# Patient Record
Sex: FEMALE | Race: WHITE | NOT HISPANIC OR LATINO
[De-identification: names, ages, dates, MRNs, and addresses within clinical notes are randomized per-mention and may not be internally consistent; named-entity substitution may affect disease eponyms.]

---

## 2017-03-20 ENCOUNTER — APPOINTMENT (OUTPATIENT)
Dept: INTERNAL MEDICINE | Facility: CLINIC | Age: 42
End: 2017-03-20

## 2017-03-20 VITALS
BODY MASS INDEX: 33.27 KG/M2 | SYSTOLIC BLOOD PRESSURE: 134 MMHG | TEMPERATURE: 98.8 F | WEIGHT: 207 LBS | HEART RATE: 76 BPM | RESPIRATION RATE: 12 BRPM | HEIGHT: 66 IN | OXYGEN SATURATION: 99 % | DIASTOLIC BLOOD PRESSURE: 81 MMHG

## 2017-03-20 DIAGNOSIS — N60.01 SOLITARY CYST OF RIGHT BREAST: ICD-10-CM

## 2017-03-20 DIAGNOSIS — G47.9 SLEEP DISORDER, UNSPECIFIED: ICD-10-CM

## 2017-03-20 DIAGNOSIS — R01.1 CARDIAC MURMUR, UNSPECIFIED: ICD-10-CM

## 2017-03-20 DIAGNOSIS — R92.2 INCONCLUSIVE MAMMOGRAM: ICD-10-CM

## 2017-03-21 LAB
25(OH)D3 SERPL-MCNC: 29.1 NG/ML
ALBUMIN SERPL ELPH-MCNC: 4.4 G/DL
ALP BLD-CCNC: 57 U/L
ALT SERPL-CCNC: 16 U/L
ANION GAP SERPL CALC-SCNC: 16 MMOL/L
APPEARANCE: ABNORMAL
AST SERPL-CCNC: 20 U/L
BACTERIA: ABNORMAL
BILIRUB SERPL-MCNC: 0.5 MG/DL
BILIRUBIN URINE: NEGATIVE
BLOOD URINE: NEGATIVE
BUN SERPL-MCNC: 11 MG/DL
CALCIUM SERPL-MCNC: 9.4 MG/DL
CHLORIDE SERPL-SCNC: 101 MMOL/L
CHOLEST SERPL-MCNC: 215 MG/DL
CHOLEST/HDLC SERPL: 2.9 RATIO
CO2 SERPL-SCNC: 23 MMOL/L
COLOR: YELLOW
CREAT SERPL-MCNC: 0.76 MG/DL
FERRITIN SERPL-MCNC: 39.9 NG/ML
FOLATE SERPL-MCNC: 16.9 NG/ML
GLUCOSE QUALITATIVE U: NORMAL MG/DL
GLUCOSE SERPL-MCNC: 95 MG/DL
HBA1C MFR BLD HPLC: 5.4 %
HDLC SERPL-MCNC: 74 MG/DL
HYALINE CASTS: 4 /LPF
IRON SERPL-MCNC: 100 UG/DL
KETONES URINE: NEGATIVE
LDLC SERPL CALC-MCNC: 121 MG/DL
LEUKOCYTE ESTERASE URINE: ABNORMAL
MICROSCOPIC-UA: NORMAL
NITRITE URINE: NEGATIVE
PH URINE: 5
POTASSIUM SERPL-SCNC: 4.7 MMOL/L
PROT SERPL-MCNC: 6.7 G/DL
PROTEIN URINE: NEGATIVE MG/DL
RED BLOOD CELLS URINE: 1 /HPF
SODIUM SERPL-SCNC: 140 MMOL/L
SPECIFIC GRAVITY URINE: 1.02
SQUAMOUS EPITHELIAL CELLS: 16 /HPF
TRIGL SERPL-MCNC: 98 MG/DL
TSH SERPL-ACNC: 1.4 UIU/ML
URATE SERPL-MCNC: 2.9 MG/DL
UROBILINOGEN URINE: NORMAL MG/DL
VIT B12 SERPL-MCNC: 783 PG/ML
WHITE BLOOD CELLS URINE: 5 /HPF

## 2017-03-24 LAB
BASOPHILS # BLD AUTO: 0.05 K/UL
BASOPHILS NFR BLD AUTO: 0.5 %
EOSINOPHIL # BLD AUTO: 0.11 K/UL
EOSINOPHIL NFR BLD AUTO: 1.1 %
HCT VFR BLD CALC: 44.2 %
HGB BLD-MCNC: 14.2 G/DL
IMM GRANULOCYTES NFR BLD AUTO: 0.2 %
LYMPHOCYTES # BLD AUTO: 2.54 K/UL
LYMPHOCYTES NFR BLD AUTO: 26.3 %
MAN DIFF?: NORMAL
MCHC RBC-ENTMCNC: 30 PG
MCHC RBC-ENTMCNC: 32.1 GM/DL
MCV RBC AUTO: 93.2 FL
MONOCYTES # BLD AUTO: 0.49 K/UL
MONOCYTES NFR BLD AUTO: 5.1 %
NEUTROPHILS # BLD AUTO: 6.45 K/UL
NEUTROPHILS NFR BLD AUTO: 66.8 %
PLATELET # BLD AUTO: 284 K/UL
RBC # BLD: 4.74 M/UL
RBC # FLD: 13 %
WBC # FLD AUTO: 9.66 K/UL

## 2017-04-17 LAB — BACTERIA UR CULT: ABNORMAL

## 2017-04-19 LAB
APPEARANCE: ABNORMAL
BACTERIA: ABNORMAL
BILIRUBIN URINE: NEGATIVE
BLOOD URINE: NEGATIVE
COLOR: YELLOW
GLUCOSE QUALITATIVE U: 500 MG/DL
HYALINE CASTS: 0 /LPF
KETONES URINE: NEGATIVE
LEUKOCYTE ESTERASE URINE: NEGATIVE
MICROSCOPIC-UA: NORMAL
NITRITE URINE: NEGATIVE
PH URINE: 7
PROTEIN URINE: NEGATIVE MG/DL
RED BLOOD CELLS URINE: 1 /HPF
SPECIFIC GRAVITY URINE: 1.03
SQUAMOUS EPITHELIAL CELLS: 15 /HPF
URINE COMMENTS: NORMAL
UROBILINOGEN URINE: NORMAL MG/DL
WHITE BLOOD CELLS URINE: 5 /HPF

## 2017-06-28 ENCOUNTER — APPOINTMENT (OUTPATIENT)
Dept: CARDIOLOGY | Facility: CLINIC | Age: 42
End: 2017-06-28

## 2017-06-29 ENCOUNTER — APPOINTMENT (OUTPATIENT)
Dept: SURGERY | Facility: CLINIC | Age: 42
End: 2017-06-29

## 2017-08-08 ENCOUNTER — APPOINTMENT (OUTPATIENT)
Dept: SURGERY | Facility: CLINIC | Age: 42
End: 2017-08-08
Payer: COMMERCIAL

## 2017-08-08 VITALS
DIASTOLIC BLOOD PRESSURE: 72 MMHG | HEIGHT: 66.5 IN | HEART RATE: 86 BPM | SYSTOLIC BLOOD PRESSURE: 109 MMHG | WEIGHT: 205 LBS | BODY MASS INDEX: 32.56 KG/M2

## 2017-08-08 PROCEDURE — 99243 OFF/OP CNSLTJ NEW/EST LOW 30: CPT | Mod: 25

## 2017-08-08 PROCEDURE — 36415 COLL VENOUS BLD VENIPUNCTURE: CPT

## 2017-08-09 LAB
T3 SERPL-MCNC: 116 NG/DL
T4 FREE SERPL-MCNC: 1.2 NG/DL
TSH SERPL-ACNC: 1.42 UIU/ML

## 2017-08-10 ENCOUNTER — OTHER (OUTPATIENT)
Age: 42
End: 2017-08-10

## 2017-08-10 LAB
THYROGLOB AB SERPL-ACNC: <20 IU/ML
THYROPEROXIDASE AB SERPL IA-ACNC: <10 IU/ML

## 2018-01-24 ENCOUNTER — APPOINTMENT (OUTPATIENT)
Dept: CARDIOLOGY | Facility: CLINIC | Age: 43
End: 2018-01-24
Payer: COMMERCIAL

## 2018-01-24 ENCOUNTER — NON-APPOINTMENT (OUTPATIENT)
Age: 43
End: 2018-01-24

## 2018-01-24 VITALS
WEIGHT: 206 LBS | RESPIRATION RATE: 12 BRPM | HEIGHT: 66.5 IN | SYSTOLIC BLOOD PRESSURE: 120 MMHG | HEART RATE: 89 BPM | BODY MASS INDEX: 32.72 KG/M2 | OXYGEN SATURATION: 99 % | DIASTOLIC BLOOD PRESSURE: 78 MMHG

## 2018-01-24 DIAGNOSIS — R06.09 OTHER FORMS OF DYSPNEA: ICD-10-CM

## 2018-01-24 DIAGNOSIS — Z87.891 PERSONAL HISTORY OF NICOTINE DEPENDENCE: ICD-10-CM

## 2018-01-24 DIAGNOSIS — Z87.2 PERSONAL HISTORY OF DISEASES OF THE SKIN AND SUBCUTANEOUS TISSUE: ICD-10-CM

## 2018-01-24 PROCEDURE — 99244 OFF/OP CNSLTJ NEW/EST MOD 40: CPT | Mod: 25

## 2018-01-24 PROCEDURE — 93000 ELECTROCARDIOGRAM COMPLETE: CPT

## 2018-02-22 ENCOUNTER — APPOINTMENT (OUTPATIENT)
Dept: CARDIOLOGY | Facility: CLINIC | Age: 43
End: 2018-02-22
Payer: COMMERCIAL

## 2018-02-22 PROCEDURE — 93306 TTE W/DOPPLER COMPLETE: CPT

## 2018-02-22 PROCEDURE — 93015 CV STRESS TEST SUPVJ I&R: CPT

## 2018-04-30 ENCOUNTER — APPOINTMENT (OUTPATIENT)
Dept: INTERNAL MEDICINE | Facility: CLINIC | Age: 43
End: 2018-04-30
Payer: COMMERCIAL

## 2018-04-30 VITALS
BODY MASS INDEX: 33.03 KG/M2 | SYSTOLIC BLOOD PRESSURE: 112 MMHG | WEIGHT: 208 LBS | OXYGEN SATURATION: 100 % | DIASTOLIC BLOOD PRESSURE: 73 MMHG | TEMPERATURE: 98.7 F | HEART RATE: 78 BPM | RESPIRATION RATE: 12 BRPM | HEIGHT: 66.5 IN

## 2018-04-30 DIAGNOSIS — L40.50 ARTHROPATHIC PSORIASIS, UNSPECIFIED: ICD-10-CM

## 2018-04-30 PROCEDURE — 99396 PREV VISIT EST AGE 40-64: CPT

## 2018-04-30 RX ORDER — SECUKINUMAB 150 MG/ML
150 INJECTION SUBCUTANEOUS
Refills: 0 | Status: DISCONTINUED | COMMUNITY
End: 2018-04-30

## 2018-04-30 RX ORDER — KETOCONAZOLE 20 MG/G
2 CREAM TOPICAL
Qty: 60 | Refills: 0 | Status: DISCONTINUED | COMMUNITY
Start: 2017-10-26

## 2018-04-30 RX ORDER — TAZAROTENE 1 MG/G
0.1 CREAM TOPICAL
Qty: 60 | Refills: 0 | Status: ACTIVE | COMMUNITY
Start: 2018-01-11

## 2018-04-30 RX ORDER — CALCIPOTRIENE AND BETAMETHASONE DIPROPIONATE 50; .5 UG/G; MG/G
0.005-0.064 AEROSOL, FOAM TOPICAL
Qty: 60 | Refills: 0 | Status: ACTIVE | COMMUNITY
Start: 2018-03-22

## 2018-04-30 RX ORDER — CYCLOBENZAPRINE HYDROCHLORIDE 5 MG/1
5 TABLET, FILM COATED ORAL
Qty: 60 | Refills: 0 | Status: DISCONTINUED | COMMUNITY
Start: 2018-03-14

## 2018-04-30 RX ORDER — HALOBETASOL PROPIONATE 0.5 MG/G
0.05 OINTMENT TOPICAL
Qty: 50 | Refills: 0 | Status: ACTIVE | COMMUNITY
Start: 2018-03-22

## 2018-05-01 ENCOUNTER — APPOINTMENT (OUTPATIENT)
Dept: SURGERY | Facility: CLINIC | Age: 43
End: 2018-05-01
Payer: COMMERCIAL

## 2018-05-01 LAB
25(OH)D3 SERPL-MCNC: 16.9 NG/ML
ALBUMIN SERPL ELPH-MCNC: 4.3 G/DL
ALP BLD-CCNC: 56 U/L
ALT SERPL-CCNC: 17 U/L
ANION GAP SERPL CALC-SCNC: 18 MMOL/L
APPEARANCE: CLEAR
AST SERPL-CCNC: 23 U/L
BILIRUB SERPL-MCNC: 0.4 MG/DL
BILIRUBIN URINE: NEGATIVE
BLOOD URINE: NEGATIVE
BUN SERPL-MCNC: 12 MG/DL
CALCIUM SERPL-MCNC: 9.8 MG/DL
CHLORIDE SERPL-SCNC: 98 MMOL/L
CHOLEST SERPL-MCNC: 229 MG/DL
CHOLEST/HDLC SERPL: 3.3 RATIO
CO2 SERPL-SCNC: 22 MMOL/L
COLOR: YELLOW
CREAT SERPL-MCNC: 0.71 MG/DL
CREAT SPEC-SCNC: 62 MG/DL
ERYTHROCYTE [SEDIMENTATION RATE] IN BLOOD BY WESTERGREN METHOD: 13 MM/HR
FERRITIN SERPL-MCNC: 44 NG/ML
FOLATE SERPL-MCNC: 10.5 NG/ML
GLUCOSE QUALITATIVE U: NEGATIVE MG/DL
GLUCOSE SERPL-MCNC: 76 MG/DL
HBA1C MFR BLD HPLC: 5.1 %
HDLC SERPL-MCNC: 70 MG/DL
KETONES URINE: NEGATIVE
LDLC SERPL CALC-MCNC: 144 MG/DL
LEUKOCYTE ESTERASE URINE: NEGATIVE
MAGNESIUM SERPL-MCNC: 2.1 MG/DL
MICROALBUMIN 24H UR DL<=1MG/L-MCNC: <0.3 MG/DL
MICROALBUMIN/CREAT 24H UR-RTO: NORMAL
NITRITE URINE: NEGATIVE
PH URINE: 5.5
POTASSIUM SERPL-SCNC: 4.4 MMOL/L
PROT SERPL-MCNC: 7.5 G/DL
PROTEIN URINE: NEGATIVE MG/DL
SODIUM SERPL-SCNC: 138 MMOL/L
SPECIFIC GRAVITY URINE: 1.01
TRIGL SERPL-MCNC: 76 MG/DL
TSH SERPL-ACNC: 1.82 UIU/ML
UROBILINOGEN URINE: NEGATIVE MG/DL
VIT B12 SERPL-MCNC: 637 PG/ML

## 2018-05-01 PROCEDURE — 99213 OFFICE O/P EST LOW 20 MIN: CPT

## 2018-05-04 LAB
BASOPHILS # BLD AUTO: 0.05 K/UL
BASOPHILS NFR BLD AUTO: 0.6 %
EOSINOPHIL # BLD AUTO: 0.09 K/UL
EOSINOPHIL NFR BLD AUTO: 1 %
HCT VFR BLD CALC: 43.1 %
HGB BLD-MCNC: 13.8 G/DL
IMM GRANULOCYTES NFR BLD AUTO: 0.1 %
LYMPHOCYTES # BLD AUTO: 2.2 K/UL
LYMPHOCYTES NFR BLD AUTO: 24.8 %
MAN DIFF?: NORMAL
MCHC RBC-ENTMCNC: 30.3 PG
MCHC RBC-ENTMCNC: 32 GM/DL
MCV RBC AUTO: 94.7 FL
MONOCYTES # BLD AUTO: 0.42 K/UL
MONOCYTES NFR BLD AUTO: 4.7 %
NEUTROPHILS # BLD AUTO: 6.11 K/UL
NEUTROPHILS NFR BLD AUTO: 68.8 %
PLATELET # BLD AUTO: 293 K/UL
RBC # BLD: 4.55 M/UL
RBC # FLD: 13.9 %
WBC # FLD AUTO: 8.88 K/UL

## 2018-05-14 ENCOUNTER — FORM ENCOUNTER (OUTPATIENT)
Age: 43
End: 2018-05-14

## 2018-05-15 ENCOUNTER — APPOINTMENT (OUTPATIENT)
Dept: SURGICAL ONCOLOGY | Facility: CLINIC | Age: 43
End: 2018-05-15
Payer: COMMERCIAL

## 2018-05-15 ENCOUNTER — OUTPATIENT (OUTPATIENT)
Dept: OUTPATIENT SERVICES | Facility: HOSPITAL | Age: 43
LOS: 1 days | End: 2018-05-15
Payer: COMMERCIAL

## 2018-05-15 ENCOUNTER — APPOINTMENT (OUTPATIENT)
Dept: ULTRASOUND IMAGING | Facility: IMAGING CENTER | Age: 43
End: 2018-05-15
Payer: COMMERCIAL

## 2018-05-15 VITALS
WEIGHT: 205 LBS | HEART RATE: 90 BPM | HEIGHT: 67 IN | SYSTOLIC BLOOD PRESSURE: 124 MMHG | BODY MASS INDEX: 32.18 KG/M2 | DIASTOLIC BLOOD PRESSURE: 75 MMHG | RESPIRATION RATE: 15 BRPM

## 2018-05-15 DIAGNOSIS — M79.9 SOFT TISSUE DISORDER, UNSPECIFIED: ICD-10-CM

## 2018-05-15 PROCEDURE — 76882 US LMTD JT/FCL EVL NVASC XTR: CPT | Mod: 26,RT

## 2018-05-15 PROCEDURE — 99204 OFFICE O/P NEW MOD 45 MIN: CPT

## 2018-05-15 PROCEDURE — 76882 US LMTD JT/FCL EVL NVASC XTR: CPT

## 2018-05-16 PROBLEM — M79.9 SOFT TISSUE MASS: Status: ACTIVE | Noted: 2018-04-30

## 2018-06-12 ENCOUNTER — APPOINTMENT (OUTPATIENT)
Dept: OBGYN | Facility: CLINIC | Age: 43
End: 2018-06-12
Payer: COMMERCIAL

## 2018-06-12 ENCOUNTER — RESULT REVIEW (OUTPATIENT)
Age: 43
End: 2018-06-12

## 2018-06-12 PROCEDURE — 99386 PREV VISIT NEW AGE 40-64: CPT

## 2018-10-29 ENCOUNTER — LABORATORY RESULT (OUTPATIENT)
Age: 43
End: 2018-10-29

## 2018-10-29 ENCOUNTER — APPOINTMENT (OUTPATIENT)
Dept: INTERNAL MEDICINE | Facility: CLINIC | Age: 43
End: 2018-10-29
Payer: COMMERCIAL

## 2018-10-29 VITALS
RESPIRATION RATE: 15 BRPM | TEMPERATURE: 98.5 F | BODY MASS INDEX: 29.51 KG/M2 | HEIGHT: 67 IN | OXYGEN SATURATION: 98 % | DIASTOLIC BLOOD PRESSURE: 84 MMHG | WEIGHT: 188 LBS | HEART RATE: 89 BPM | SYSTOLIC BLOOD PRESSURE: 137 MMHG

## 2018-10-29 DIAGNOSIS — F43.22 ADJUSTMENT DISORDER WITH ANXIETY: ICD-10-CM

## 2018-10-29 PROCEDURE — 99215 OFFICE O/P EST HI 40 MIN: CPT

## 2018-10-29 RX ORDER — SIMVASTATIN 10 MG/1
10 TABLET, FILM COATED ORAL
Qty: 30 | Refills: 3 | Status: DISCONTINUED | COMMUNITY
Start: 2018-05-01 | End: 2018-10-29

## 2018-10-29 NOTE — HEALTH RISK ASSESSMENT
[No falls in past year] : Patient reported no falls in the past year [2] : 2) Feeling down, depressed, or hopeless for more than half of the days (2) [] : No

## 2018-10-30 LAB
25(OH)D3 SERPL-MCNC: 36.6 NG/ML
ALBUMIN SERPL ELPH-MCNC: 4.6 G/DL
ALP BLD-CCNC: 48 U/L
ALT SERPL-CCNC: 15 U/L
ANION GAP SERPL CALC-SCNC: 11 MMOL/L
APPEARANCE: ABNORMAL
AST SERPL-CCNC: 20 U/L
BILIRUB SERPL-MCNC: 0.4 MG/DL
BILIRUBIN URINE: NEGATIVE
BLOOD URINE: NEGATIVE
BUN SERPL-MCNC: 9 MG/DL
CALCIUM SERPL-MCNC: 9.9 MG/DL
CHLORIDE SERPL-SCNC: 106 MMOL/L
CHOLEST SERPL-MCNC: 185 MG/DL
CHOLEST/HDLC SERPL: 3.1 RATIO
CO2 SERPL-SCNC: 26 MMOL/L
COLOR: YELLOW
CREAT SERPL-MCNC: 0.67 MG/DL
FERRITIN SERPL-MCNC: 41 NG/ML
FOLATE SERPL-MCNC: 7.4 NG/ML
GLUCOSE QUALITATIVE U: NEGATIVE MG/DL
GLUCOSE SERPL-MCNC: 102 MG/DL
GLUCOSE SERPL-MCNC: 107 MG/DL
HBA1C MFR BLD HPLC: 5.1 %
HDLC SERPL-MCNC: 60 MG/DL
KETONES URINE: NEGATIVE
LDLC SERPL CALC-MCNC: 107 MG/DL
LEUKOCYTE ESTERASE URINE: ABNORMAL
NITRITE URINE: NEGATIVE
PH URINE: 5.5
POTASSIUM SERPL-SCNC: 5.2 MMOL/L
PROT SERPL-MCNC: 7.2 G/DL
PROTEIN URINE: ABNORMAL MG/DL
SODIUM SERPL-SCNC: 143 MMOL/L
SPECIFIC GRAVITY URINE: 1.02
T4 FREE SERPL-MCNC: 1.4 NG/DL
TRIGL SERPL-MCNC: 90 MG/DL
TSH SERPL-ACNC: 1.11 UIU/ML
URATE SERPL-MCNC: 2.4 MG/DL
UROBILINOGEN URINE: NEGATIVE MG/DL
VIT B12 SERPL-MCNC: 652 PG/ML

## 2018-11-06 LAB
BASOPHILS # BLD AUTO: 0.02 K/UL
BASOPHILS NFR BLD AUTO: 0.3 %
EOSINOPHIL # BLD AUTO: 0.04 K/UL
EOSINOPHIL NFR BLD AUTO: 0.6 %
HCT VFR BLD CALC: 43.3 %
HGB BLD-MCNC: 13.8 G/DL
IMM GRANULOCYTES NFR BLD AUTO: 0.1 %
LYMPHOCYTES # BLD AUTO: 1.28 K/UL
LYMPHOCYTES NFR BLD AUTO: 18.2 %
MAN DIFF?: NORMAL
MCHC RBC-ENTMCNC: 29.2 PG
MCHC RBC-ENTMCNC: 31.9 GM/DL
MCV RBC AUTO: 91.5 FL
MONOCYTES # BLD AUTO: 0.37 K/UL
MONOCYTES NFR BLD AUTO: 5.2 %
NEUTROPHILS # BLD AUTO: 5.33 K/UL
NEUTROPHILS NFR BLD AUTO: 75.6 %
PLATELET # BLD AUTO: 283 K/UL
RBC # BLD: 4.73 M/UL
RBC # FLD: 13.3 %
WBC # FLD AUTO: 7.05 K/UL

## 2018-11-26 ENCOUNTER — APPOINTMENT (OUTPATIENT)
Dept: OBGYN | Facility: CLINIC | Age: 43
End: 2018-11-26
Payer: COMMERCIAL

## 2018-11-26 PROCEDURE — 99213 OFFICE O/P EST LOW 20 MIN: CPT

## 2019-02-07 ENCOUNTER — APPOINTMENT (OUTPATIENT)
Dept: OBGYN | Facility: CLINIC | Age: 44
End: 2019-02-07
Payer: COMMERCIAL

## 2019-02-07 PROCEDURE — 36415 COLL VENOUS BLD VENIPUNCTURE: CPT

## 2019-02-07 PROCEDURE — 99213 OFFICE O/P EST LOW 20 MIN: CPT

## 2019-03-07 ENCOUNTER — LABORATORY RESULT (OUTPATIENT)
Age: 44
End: 2019-03-07

## 2019-03-07 ENCOUNTER — APPOINTMENT (OUTPATIENT)
Dept: CARDIOLOGY | Facility: CLINIC | Age: 44
End: 2019-03-07
Payer: COMMERCIAL

## 2019-03-07 VITALS
SYSTOLIC BLOOD PRESSURE: 124 MMHG | RESPIRATION RATE: 12 BRPM | BODY MASS INDEX: 27.62 KG/M2 | HEIGHT: 67 IN | OXYGEN SATURATION: 99 % | WEIGHT: 176 LBS | DIASTOLIC BLOOD PRESSURE: 77 MMHG | HEART RATE: 84 BPM

## 2019-03-07 PROCEDURE — 99214 OFFICE O/P EST MOD 30 MIN: CPT | Mod: 25

## 2019-03-07 PROCEDURE — 93000 ELECTROCARDIOGRAM COMPLETE: CPT

## 2019-03-08 LAB
CK MB BLD-MCNC: 1.4 NG/ML
CK SERPL-CCNC: 71 U/L

## 2019-03-08 NOTE — PHYSICAL EXAM
[General Appearance - Well Developed] : well developed [Normal Appearance] : normal appearance [Well Groomed] : well groomed [General Appearance - Well Nourished] : well nourished [No Deformities] : no deformities [General Appearance - In No Acute Distress] : no acute distress [Normal Conjunctiva] : the conjunctiva exhibited no abnormalities [Normal Oral Mucosa] : normal oral mucosa [No Oral Pallor] : no oral pallor [No Oral Cyanosis] : no oral cyanosis [Normal Jugular Venous A Waves Present] : normal jugular venous A waves present [Normal Jugular Venous V Waves Present] : normal jugular venous V waves present [No Jugular Venous Proctor A Waves] : no jugular venous proctor A waves [Respiration, Rhythm And Depth] : normal respiratory rhythm and effort [Exaggerated Use Of Accessory Muscles For Inspiration] : no accessory muscle use [Auscultation Breath Sounds / Voice Sounds] : lungs were clear to auscultation bilaterally [Bowel Sounds] : normal bowel sounds [Abdomen Soft] : soft [Abdomen Tenderness] : non-tender [Abnormal Walk] : normal gait [Gait - Sufficient For Exercise Testing] : the gait was sufficient for exercise testing [Nail Clubbing] : no clubbing of the fingernails [Cyanosis, Localized] : no localized cyanosis [Petechial Hemorrhages (___cm)] : no petechial hemorrhages [Skin Color & Pigmentation] : normal skin color and pigmentation [] : no rash [Skin Lesions] : no skin lesions [No Skin Ulcers] : no skin ulcer [Oriented To Time, Place, And Person] : oriented to person, place, and time [Affect] : the affect was normal [Mood] : the mood was normal [No Anxiety] : not feeling anxious [Normal Rate] : normal [Rhythm Regular] : regular [Normal S1] : normal S1 [Normal S2] : normal S2 [No Pitting Edema] : no pitting edema present [II] : a grade 2 [FreeTextEntry1] : Extraocular muscles intact.  Anicteric sclerae. [S3] : no S3 [Right Carotid Bruit] : no bruit heard over the right carotid [Left Carotid Bruit] : no bruit heard over the left carotid [Bruit] : no bruit heard

## 2019-03-08 NOTE — DISCUSSION/SUMMARY
[FreeTextEntry1] : IMPRESSION: Ms. Curry is a 43 year old woman with a history of psoriatic arthritis, family history of coronary artery disease, former tobacco use, dyslipidemia, and elevated blood pressure in the past who presents for evaluation of chest pain. \par \par PLAN:\par 1. Her chest pain is atypical at this time, however, I am concerned that she had 2 episodes within an hour of each other this morning. Her ECG does not reveal any ST segment abnormalities. She had a normal exercise stress test a year ago. I will be checking cardiac enzymes and have asked her to schedule an echocardiogram, which will also evaluate the murmur auscultated on exam. \par 2. She is under stress at home, thus she will continue on Clonazepam as needed. I-Stop was checked. She will follow up with you as she states that she feels that she needs to take something on a daily basis. \par 3. I have advised her to go to the ER should she experience any recurrent symptoms. \par 4. She will continue on diet modification given her dyslipidemia as her most recent LDL was improved.

## 2019-03-08 NOTE — HISTORY OF PRESENT ILLNESS
[FreeTextEntry1] : Patient is a 43 year old woman with a history of psoriatic arthritis, family history of coronary artery disease, former tobacco use, dyslipidemia, and elevated blood pressure in the past who presents for evaluation of chest pain. She states that she has been experiencing episodes of chest pain for several months that started after she started having issues at home. This morning, at around 10 AM, she started to experience chest pain that lasted for about 30 minutes. She states that the pain went away and came back at around 11 AM that was stronger. She states that she now has an ache of her chest and states that her symptoms only occurred at rest. She otherwise denies any exertional chest pain, dyspnea, palpitations, headaches, and dizziness.

## 2019-04-08 ENCOUNTER — APPOINTMENT (OUTPATIENT)
Dept: INTERNAL MEDICINE | Facility: CLINIC | Age: 44
End: 2019-04-08
Payer: COMMERCIAL

## 2019-04-08 VITALS
WEIGHT: 178 LBS | TEMPERATURE: 98.1 F | OXYGEN SATURATION: 99 % | SYSTOLIC BLOOD PRESSURE: 114 MMHG | BODY MASS INDEX: 27.94 KG/M2 | RESPIRATION RATE: 12 BRPM | DIASTOLIC BLOOD PRESSURE: 74 MMHG | HEART RATE: 83 BPM | HEIGHT: 67 IN

## 2019-04-08 PROCEDURE — 99396 PREV VISIT EST AGE 40-64: CPT

## 2019-04-08 RX ORDER — IXEKIZUMAB 80 MG/ML
80 INJECTION, SOLUTION SUBCUTANEOUS
Refills: 0 | Status: ACTIVE | COMMUNITY

## 2019-04-08 RX ORDER — ADHESIVE TAPE 3"X 2.3 YD
50 MCG TAPE, NON-MEDICATED TOPICAL DAILY
Qty: 60 | Refills: 0 | Status: ACTIVE | COMMUNITY

## 2019-04-08 RX ORDER — SECUKINUMAB 150 MG/ML
150 INJECTION SUBCUTANEOUS
Qty: 2 | Refills: 0 | Status: DISCONTINUED | COMMUNITY
Start: 2018-02-26 | End: 2019-04-08

## 2019-04-08 RX ORDER — FERROUS SULFATE 47.5 IRON
160 (50 FE) TABLET, EXTENDED RELEASE ORAL
Refills: 0 | Status: DISCONTINUED | COMMUNITY
End: 2019-04-08

## 2019-04-17 LAB
25(OH)D3 SERPL-MCNC: 32.8 NG/ML
ALBUMIN SERPL ELPH-MCNC: 4.4 G/DL
ALP BLD-CCNC: 54 U/L
ALT SERPL-CCNC: 11 U/L
ANION GAP SERPL CALC-SCNC: 12 MMOL/L
APPEARANCE: CLEAR
AST SERPL-CCNC: 15 U/L
BILIRUB SERPL-MCNC: 0.5 MG/DL
BILIRUBIN URINE: NEGATIVE
BLOOD URINE: NEGATIVE
BUN SERPL-MCNC: 15 MG/DL
CALCIUM SERPL-MCNC: 9.2 MG/DL
CHLORIDE SERPL-SCNC: 103 MMOL/L
CHOLEST SERPL-MCNC: 202 MG/DL
CHOLEST/HDLC SERPL: 3 RATIO
CO2 SERPL-SCNC: 25 MMOL/L
COLOR: YELLOW
CREAT SERPL-MCNC: 0.76 MG/DL
CREAT SPEC-SCNC: 107 MG/DL
ESTIMATED AVERAGE GLUCOSE: 100 MG/DL
FERRITIN SERPL-MCNC: 28 NG/ML
FOLATE SERPL-MCNC: 8.8 NG/ML
GLUCOSE QUALITATIVE U: NEGATIVE
GLUCOSE SERPL-MCNC: 81 MG/DL
GLUCOSE SERPL-MCNC: 85 MG/DL
HBA1C MFR BLD HPLC: 5.1 %
HDLC SERPL-MCNC: 68 MG/DL
KETONES URINE: NEGATIVE
LDLC SERPL CALC-MCNC: 119 MG/DL
LEUKOCYTE ESTERASE URINE: NEGATIVE
MICROALBUMIN 24H UR DL<=1MG/L-MCNC: <1.2 MG/DL
MICROALBUMIN/CREAT 24H UR-RTO: NORMAL MG/G
NITRITE URINE: NEGATIVE
PH URINE: 6.5
POTASSIUM SERPL-SCNC: 4.2 MMOL/L
PROT SERPL-MCNC: 6.8 G/DL
PROTEIN URINE: NEGATIVE
SODIUM SERPL-SCNC: 140 MMOL/L
SPECIFIC GRAVITY URINE: 1.02
TRIGL SERPL-MCNC: 75 MG/DL
TSH SERPL-ACNC: 1.29 UIU/ML
UROBILINOGEN URINE: NORMAL
VIT B12 SERPL-MCNC: 637 PG/ML

## 2019-04-19 LAB
BASOPHILS # BLD AUTO: 0.03 K/UL
BASOPHILS NFR BLD AUTO: 0.5 %
EOSINOPHIL # BLD AUTO: 0.07 K/UL
EOSINOPHIL NFR BLD AUTO: 1.2 %
HCT VFR BLD CALC: 42 %
HGB BLD-MCNC: 13.5 G/DL
IMM GRANULOCYTES NFR BLD AUTO: 0.2 %
LYMPHOCYTES # BLD AUTO: 1.47 K/UL
LYMPHOCYTES NFR BLD AUTO: 24.2 %
MAN DIFF?: NORMAL
MCHC RBC-ENTMCNC: 30 PG
MCHC RBC-ENTMCNC: 32.1 GM/DL
MCV RBC AUTO: 93.3 FL
MONOCYTES # BLD AUTO: 0.34 K/UL
MONOCYTES NFR BLD AUTO: 5.6 %
NEUTROPHILS # BLD AUTO: 4.16 K/UL
NEUTROPHILS NFR BLD AUTO: 68.3 %
PLATELET # BLD AUTO: 272 K/UL
RBC # BLD: 4.5 M/UL
RBC # FLD: 12.2 %
WBC # FLD AUTO: 6.08 K/UL

## 2019-04-23 LAB
M TB IFN-G BLD-IMP: NEGATIVE
QUANTIFERON TB PLUS MITOGEN MINUS NIL: 2.6 IU/ML
QUANTIFERON TB PLUS NIL: 0.04 IU/ML
QUANTIFERON TB PLUS TB1 MINUS NIL: -0.01 IU/ML
QUANTIFERON TB PLUS TB2 MINUS NIL: -0.01 IU/ML

## 2019-05-30 ENCOUNTER — APPOINTMENT (OUTPATIENT)
Dept: CARDIOLOGY | Facility: CLINIC | Age: 44
End: 2019-05-30

## 2019-06-10 ENCOUNTER — OTHER (OUTPATIENT)
Age: 44
End: 2019-06-10

## 2019-06-20 ENCOUNTER — APPOINTMENT (OUTPATIENT)
Dept: OBGYN | Facility: CLINIC | Age: 44
End: 2019-06-20

## 2019-08-13 ENCOUNTER — APPOINTMENT (OUTPATIENT)
Dept: SURGERY | Facility: CLINIC | Age: 44
End: 2019-08-13
Payer: COMMERCIAL

## 2019-08-13 PROCEDURE — 36415 COLL VENOUS BLD VENIPUNCTURE: CPT

## 2019-08-13 PROCEDURE — 99213 OFFICE O/P EST LOW 20 MIN: CPT

## 2019-08-13 NOTE — ASSESSMENT
[FreeTextEntry1] : will observe. for sonogram and blood tests next visit.  bloods drawn. to call next week for results.  to return earlier if any change

## 2019-08-13 NOTE — HISTORY OF PRESENT ILLNESS
[de-identified] : prior evaluation of subcentimeter thyroid nodule. denies dysphagia, hoarseness or new lesions. no changes medically since last visit. recent sonogram unchanged. TSH normal

## 2019-08-13 NOTE — PHYSICAL EXAM
[de-identified] : no palpable thyroid nodules [Midline] : located in midline position [Laryngoscopy Performed] : laryngoscopy was performed, see procedure section for findings [Normal] : orientation to person, place, and time: normal

## 2019-08-14 LAB
T3 SERPL-MCNC: 92 NG/DL
T4 FREE SERPL-MCNC: 1.3 NG/DL
THYROGLOB AB SERPL-ACNC: <20 IU/ML
THYROPEROXIDASE AB SERPL IA-ACNC: <10 IU/ML
TSH SERPL-ACNC: 1.11 UIU/ML

## 2019-08-16 ENCOUNTER — RESULT REVIEW (OUTPATIENT)
Age: 44
End: 2019-08-16

## 2019-10-07 ENCOUNTER — RESULT REVIEW (OUTPATIENT)
Age: 44
End: 2019-10-07

## 2019-10-07 ENCOUNTER — APPOINTMENT (OUTPATIENT)
Dept: OBGYN | Facility: CLINIC | Age: 44
End: 2019-10-07
Payer: COMMERCIAL

## 2019-10-07 PROCEDURE — 99396 PREV VISIT EST AGE 40-64: CPT

## 2019-10-07 PROCEDURE — 36415 COLL VENOUS BLD VENIPUNCTURE: CPT

## 2020-08-04 DIAGNOSIS — K76.0 FATTY (CHANGE OF) LIVER, NOT ELSEWHERE CLASSIFIED: ICD-10-CM

## 2020-08-04 DIAGNOSIS — K42.9 UMBILICAL HERNIA W/OUT OBSTRUCTION OR GANGRENE: ICD-10-CM

## 2020-08-18 ENCOUNTER — APPOINTMENT (OUTPATIENT)
Dept: SURGERY | Facility: CLINIC | Age: 45
End: 2020-08-18

## 2020-09-14 ENCOUNTER — APPOINTMENT (OUTPATIENT)
Dept: CARDIOLOGY | Facility: CLINIC | Age: 45
End: 2020-09-14
Payer: COMMERCIAL

## 2020-09-14 VITALS
SYSTOLIC BLOOD PRESSURE: 150 MMHG | TEMPERATURE: 97.9 F | BODY MASS INDEX: 31.08 KG/M2 | HEART RATE: 82 BPM | WEIGHT: 198 LBS | HEIGHT: 67 IN | OXYGEN SATURATION: 97 % | RESPIRATION RATE: 12 BRPM | DIASTOLIC BLOOD PRESSURE: 92 MMHG

## 2020-09-14 VITALS — DIASTOLIC BLOOD PRESSURE: 82 MMHG | SYSTOLIC BLOOD PRESSURE: 146 MMHG

## 2020-09-14 PROCEDURE — 99214 OFFICE O/P EST MOD 30 MIN: CPT | Mod: 25

## 2020-09-14 NOTE — REVIEW OF SYSTEMS
[Shortness Of Breath] : shortness of breath [Headache] : headache [Chest Pain] : chest pain [Negative] : Heme/Lymph

## 2020-09-20 NOTE — PHYSICAL EXAM
[General Appearance - Well Developed] : well developed [Normal Appearance] : normal appearance [Well Groomed] : well groomed [General Appearance - Well Nourished] : well nourished [No Deformities] : no deformities [General Appearance - In No Acute Distress] : no acute distress [Normal Conjunctiva] : the conjunctiva exhibited no abnormalities [Normal Oral Mucosa] : normal oral mucosa [No Oral Pallor] : no oral pallor [No Oral Cyanosis] : no oral cyanosis [Normal Jugular Venous A Waves Present] : normal jugular venous A waves present [Normal Jugular Venous V Waves Present] : normal jugular venous V waves present [Exaggerated Use Of Accessory Muscles For Inspiration] : no accessory muscle use [Respiration, Rhythm And Depth] : normal respiratory rhythm and effort [Normal Rate] : normal [Auscultation Breath Sounds / Voice Sounds] : lungs were clear to auscultation bilaterally [Normal S1] : normal S1 [Rhythm Regular] : regular [Normal S2] : normal S2 [No Pitting Edema] : no pitting edema present [Abdomen Tenderness] : non-tender [Abdomen Soft] : soft [Abnormal Walk] : normal gait [Gait - Sufficient For Exercise Testing] : the gait was sufficient for exercise testing [Cyanosis, Localized] : no localized cyanosis [Nail Clubbing] : no clubbing of the fingernails [Petechial Hemorrhages (___cm)] : no petechial hemorrhages [] : no rash [Skin Color & Pigmentation] : normal skin color and pigmentation [Oriented To Time, Place, And Person] : oriented to person, place, and time [Affect] : the affect was normal [Mood] : the mood was normal [No Anxiety] : not feeling anxious [II] : a grade 2 [Bowel Sounds] : normal bowel sounds [No Skin Ulcers] : no skin ulcer [FreeTextEntry1] : Extraocular muscles intact. Anicteric sclerae.  [S3] : no S3 [Right Carotid Bruit] : no bruit heard over the right carotid [Left Carotid Bruit] : no bruit heard over the left carotid [Bruit] : no bruit heard

## 2020-09-20 NOTE — HISTORY OF PRESENT ILLNESS
[FreeTextEntry1] : Patient is a 45 year old woman with a history of psoriatic arthritis, family history of coronary artery disease, former tobacco use, dyslipidemia, and elevated blood pressure in the past who presents for evaluation of chest pain and and abnormal ECG prior to umbilical hernia surgery on 9/18 with Dr. Agudelo at [facility].  She reports occasional episodes of chest heaviness, where she feels like she has to take a deep breath. She is unsure if this is related to her anxiety and stress. She experiences shortness of breath both at rest and with exertion. She does report walking with no exertional symptoms. She otherwise denies any exertional chest pain, palpitations, and dizziness. She states that she has been under increased stress because of family issues.

## 2020-09-20 NOTE — DISCUSSION/SUMMARY
[FreeTextEntry1] : IMPRESSION: Ms. Curry is a 45 year old woman with a history of psoriatic arthritis, family history of coronary artery disease, former tobacco use, dyslipidemia, and elevated blood pressure in the past who presents for evaluation of chest pain and an abnormal ECG prior to umbilical hernia surgery.\par \par PLAN:\par 1. She will schedule an echocardiogram to evaluate her abnormal EKG as well as an exercise stress test to evaluate for any ischemic ECG changes given her episodes of chest heaviness.\par 2. Her blood pressure is slightly elevated, which she thinks may be due to increased stress. For now she will continue on a low sodium diet and we will continue to monitor.\par 3. She will modify her diet given her dyslipidemia and she will follow up with you for a complete physical at which time she should have a lipid profile checked.\par 4. She will follow up after her cardiac testing or sooner if she is symptomatic. I have explained to her that she is not cleared for her umbilical hernia repair.\par 5. Given that her elevated blood pressure is likely secondary to her increased stress and anxiety, I have sent in a prescription for Clonazepam. I-Stop was checked.

## 2020-10-22 ENCOUNTER — APPOINTMENT (OUTPATIENT)
Dept: CARDIOLOGY | Facility: CLINIC | Age: 45
End: 2020-10-22

## 2020-11-27 ENCOUNTER — APPOINTMENT (OUTPATIENT)
Dept: CARDIOLOGY | Facility: CLINIC | Age: 45
End: 2020-11-27

## 2020-11-30 ENCOUNTER — APPOINTMENT (OUTPATIENT)
Dept: INTERNAL MEDICINE | Facility: CLINIC | Age: 45
End: 2020-11-30

## 2020-12-02 ENCOUNTER — NON-APPOINTMENT (OUTPATIENT)
Age: 45
End: 2020-12-02

## 2020-12-18 ENCOUNTER — APPOINTMENT (OUTPATIENT)
Dept: CARDIOLOGY | Facility: CLINIC | Age: 45
End: 2020-12-18
Payer: COMMERCIAL

## 2020-12-18 PROCEDURE — 99072 ADDL SUPL MATRL&STAF TM PHE: CPT

## 2020-12-18 PROCEDURE — 93015 CV STRESS TEST SUPVJ I&R: CPT

## 2020-12-22 ENCOUNTER — NON-APPOINTMENT (OUTPATIENT)
Age: 45
End: 2020-12-22

## 2020-12-22 ENCOUNTER — APPOINTMENT (OUTPATIENT)
Dept: INTERNAL MEDICINE | Facility: CLINIC | Age: 45
End: 2020-12-22
Payer: COMMERCIAL

## 2020-12-22 VITALS
OXYGEN SATURATION: 97 % | SYSTOLIC BLOOD PRESSURE: 151 MMHG | HEART RATE: 78 BPM | RESPIRATION RATE: 12 BRPM | WEIGHT: 200 LBS | HEIGHT: 67 IN | TEMPERATURE: 97.3 F | BODY MASS INDEX: 31.39 KG/M2 | DIASTOLIC BLOOD PRESSURE: 89 MMHG

## 2020-12-22 VITALS — SYSTOLIC BLOOD PRESSURE: 135 MMHG | DIASTOLIC BLOOD PRESSURE: 85 MMHG

## 2020-12-22 DIAGNOSIS — E78.5 HYPERLIPIDEMIA, UNSPECIFIED: ICD-10-CM

## 2020-12-22 DIAGNOSIS — R03.0 ELEVATED BLOOD-PRESSURE READING, W/OUT DIAGNOSIS OF HYPERTENSION: ICD-10-CM

## 2020-12-22 DIAGNOSIS — E66.9 OBESITY, UNSPECIFIED: ICD-10-CM

## 2020-12-22 PROCEDURE — 93000 ELECTROCARDIOGRAM COMPLETE: CPT

## 2020-12-22 PROCEDURE — 99072 ADDL SUPL MATRL&STAF TM PHE: CPT

## 2020-12-22 PROCEDURE — 99396 PREV VISIT EST AGE 40-64: CPT

## 2020-12-23 LAB
APPEARANCE: CLEAR
BILIRUBIN URINE: NEGATIVE
BLOOD URINE: NEGATIVE
COLOR: NORMAL
CREAT SPEC-SCNC: 39 MG/DL
GLUCOSE QUALITATIVE U: NEGATIVE
KETONES URINE: NEGATIVE
LEUKOCYTE ESTERASE URINE: NEGATIVE
MICROALBUMIN 24H UR DL<=1MG/L-MCNC: <1.2 MG/DL
MICROALBUMIN/CREAT 24H UR-RTO: NORMAL MG/G
NITRITE URINE: NEGATIVE
PH URINE: 6.5
PROTEIN URINE: NEGATIVE
SPECIFIC GRAVITY URINE: 1.01
UROBILINOGEN URINE: NORMAL

## 2020-12-24 ENCOUNTER — APPOINTMENT (OUTPATIENT)
Dept: CARDIOLOGY | Facility: CLINIC | Age: 45
End: 2020-12-24

## 2020-12-24 NOTE — PHYSICAL EXAM
[No Acute Distress] : no acute distress [Well Nourished] : well nourished [Well Developed] : well developed [Well-Appearing] : well-appearing [Normal Sclera/Conjunctiva] : normal sclera/conjunctiva [PERRL] : pupils equal round and reactive to light [EOMI] : extraocular movements intact [Normal Outer Ear/Nose] : the outer ears and nose were normal in appearance [Normal Oropharynx] : the oropharynx was normal [No JVD] : no jugular venous distention [No Lymphadenopathy] : no lymphadenopathy [Supple] : supple [Thyroid Normal, No Nodules] : the thyroid was normal and there were no nodules present [No Respiratory Distress] : no respiratory distress  [No Accessory Muscle Use] : no accessory muscle use [Clear to Auscultation] : lungs were clear to auscultation bilaterally [Normal Rate] : normal rate  [Regular Rhythm] : with a regular rhythm [Normal S1, S2] : normal S1 and S2 [No Murmur] : no murmur heard [No Carotid Bruits] : no carotid bruits [No Abdominal Bruit] : a ~M bruit was not heard ~T in the abdomen [No Varicosities] : no varicosities [Pedal Pulses Present] : the pedal pulses are present [No Edema] : there was no peripheral edema [No Palpable Aorta] : no palpable aorta [No Extremity Clubbing/Cyanosis] : no extremity clubbing/cyanosis [Declined Breast Exam] : declined breast exam  [Soft] : abdomen soft [Non Tender] : non-tender [Non-distended] : non-distended [No Masses] : no abdominal mass palpated [No HSM] : no HSM [Normal Bowel Sounds] : normal bowel sounds [Normal Posterior Cervical Nodes] : no posterior cervical lymphadenopathy [Normal Anterior Cervical Nodes] : no anterior cervical lymphadenopathy [No CVA Tenderness] : no CVA  tenderness [No Spinal Tenderness] : no spinal tenderness [No Joint Swelling] : no joint swelling [Grossly Normal Strength/Tone] : grossly normal strength/tone [No Rash] : no rash [Coordination Grossly Intact] : coordination grossly intact [No Focal Deficits] : no focal deficits [Normal Gait] : normal gait [Normal Affect] : the affect was normal [Normal Insight/Judgement] : insight and judgment were intact [de-identified] : WILL SEE HER GYN

## 2020-12-27 ENCOUNTER — NON-APPOINTMENT (OUTPATIENT)
Age: 45
End: 2020-12-27

## 2021-02-23 ENCOUNTER — RESULT REVIEW (OUTPATIENT)
Age: 46
End: 2021-02-23

## 2021-02-28 ENCOUNTER — TRANSCRIPTION ENCOUNTER (OUTPATIENT)
Age: 46
End: 2021-02-28

## 2021-03-23 ENCOUNTER — APPOINTMENT (OUTPATIENT)
Dept: CARDIOLOGY | Facility: CLINIC | Age: 46
End: 2021-03-23
Payer: COMMERCIAL

## 2021-03-23 PROCEDURE — 93306 TTE W/DOPPLER COMPLETE: CPT

## 2021-03-23 PROCEDURE — 99072 ADDL SUPL MATRL&STAF TM PHE: CPT

## 2021-06-15 ENCOUNTER — APPOINTMENT (OUTPATIENT)
Dept: INTERNAL MEDICINE | Facility: CLINIC | Age: 46
End: 2021-06-15

## 2021-06-17 ENCOUNTER — APPOINTMENT (OUTPATIENT)
Dept: SURGERY | Facility: CLINIC | Age: 46
End: 2021-06-17
Payer: COMMERCIAL

## 2021-06-17 DIAGNOSIS — E04.0 NONTOXIC DIFFUSE GOITER: ICD-10-CM

## 2021-06-17 PROCEDURE — 99214 OFFICE O/P EST MOD 30 MIN: CPT

## 2021-06-17 PROCEDURE — 36415 COLL VENOUS BLD VENIPUNCTURE: CPT

## 2021-06-17 NOTE — ASSESSMENT
[FreeTextEntry1] : will observe. for sonogram and blood tests next visit.  bloods drawn. to call next week for results.  to return earlier if any change.  patient has been given the opportunity to ask questions, and all of the patient's questions have been answered to their satisfaction\par

## 2021-06-17 NOTE — PHYSICAL EXAM
[de-identified] : no palpable thyroid nodules [Laryngoscopy Performed] : laryngoscopy was performed, see procedure section for findings [Midline] : located in midline position [Normal] : orientation to person, place, and time: normal

## 2021-06-17 NOTE — HISTORY OF PRESENT ILLNESS
[de-identified] : prior evaluation of subcentimeter thyroid nodule. denies dysphagia, hoarseness or new lesions. no changes medically since last visit. recent sonogram unchanged.  I have reviewed all old and new data and available images.\par

## 2021-06-18 LAB
24R-OH-CALCIDIOL SERPL-MCNC: 56.9 PG/ML
CALCIUM SERPL-MCNC: 10.3 MG/DL
CALCIUM SERPL-MCNC: 10.3 MG/DL
ESTIMATED AVERAGE GLUCOSE: 108 MG/DL
FSH SERPL-MCNC: 12.2 IU/L
HBA1C MFR BLD HPLC: 5.4 %
LH SERPL-ACNC: 16.8 IU/L
PARATHYROID HORMONE INTACT: 30 PG/ML
T3 SERPL-MCNC: 97 NG/DL
T4 FREE SERPL-MCNC: 1.2 NG/DL
TSH SERPL-ACNC: 1.46 UIU/ML

## 2021-06-19 LAB
THYROGLOB AB SERPL-ACNC: <20 IU/ML
THYROPEROXIDASE AB SERPL IA-ACNC: 16.7 IU/ML

## 2021-06-21 ENCOUNTER — NON-APPOINTMENT (OUTPATIENT)
Age: 46
End: 2021-06-21

## 2021-11-11 ENCOUNTER — APPOINTMENT (OUTPATIENT)
Dept: INTERNAL MEDICINE | Facility: CLINIC | Age: 46
End: 2021-11-11

## 2022-01-25 ENCOUNTER — RESULT REVIEW (OUTPATIENT)
Age: 47
End: 2022-01-25

## 2022-02-12 ENCOUNTER — APPOINTMENT (OUTPATIENT)
Dept: MRI IMAGING | Facility: CLINIC | Age: 47
End: 2022-02-12
Payer: COMMERCIAL

## 2022-02-12 ENCOUNTER — OUTPATIENT (OUTPATIENT)
Dept: OUTPATIENT SERVICES | Facility: HOSPITAL | Age: 47
LOS: 1 days | End: 2022-02-12
Payer: COMMERCIAL

## 2022-02-12 DIAGNOSIS — N85.8 OTHER SPECIFIED NONINFLAMMATORY DISORDERS OF UTERUS: ICD-10-CM

## 2022-02-12 PROCEDURE — 72197 MRI PELVIS W/O & W/DYE: CPT | Mod: 26

## 2022-02-12 PROCEDURE — A9585: CPT

## 2022-02-12 PROCEDURE — 72197 MRI PELVIS W/O & W/DYE: CPT

## 2022-07-10 ENCOUNTER — EMERGENCY (EMERGENCY)
Facility: HOSPITAL | Age: 47
LOS: 1 days | Discharge: HOME | End: 2022-07-10
Attending: EMERGENCY MEDICINE
Payer: COMMERCIAL

## 2022-07-10 VITALS
OXYGEN SATURATION: 99 % | HEIGHT: 67 IN | RESPIRATION RATE: 19 BRPM | TEMPERATURE: 98 F | WEIGHT: 190.04 LBS | HEART RATE: 82 BPM | DIASTOLIC BLOOD PRESSURE: 85 MMHG | SYSTOLIC BLOOD PRESSURE: 174 MMHG

## 2022-07-10 VITALS
RESPIRATION RATE: 18 BRPM | SYSTOLIC BLOOD PRESSURE: 142 MMHG | DIASTOLIC BLOOD PRESSURE: 72 MMHG | HEART RATE: 78 BPM | OXYGEN SATURATION: 100 %

## 2022-07-10 DIAGNOSIS — Z98.891 HISTORY OF UTERINE SCAR FROM PREVIOUS SURGERY: Chronic | ICD-10-CM

## 2022-07-10 LAB
ALBUMIN SERPL ELPH-MCNC: 4.2 G/DL — SIGNIFICANT CHANGE UP (ref 3.3–5)
ALP SERPL-CCNC: 74 U/L — SIGNIFICANT CHANGE UP (ref 40–120)
ALT FLD-CCNC: 21 U/L — SIGNIFICANT CHANGE UP (ref 10–45)
ANION GAP SERPL CALC-SCNC: 10 MMOL/L — SIGNIFICANT CHANGE UP (ref 5–17)
AST SERPL-CCNC: 18 U/L — SIGNIFICANT CHANGE UP (ref 10–40)
BASOPHILS # BLD AUTO: 0.04 K/UL — SIGNIFICANT CHANGE UP (ref 0–0.2)
BASOPHILS NFR BLD AUTO: 0.6 % — SIGNIFICANT CHANGE UP (ref 0–2)
BILIRUB SERPL-MCNC: 0.3 MG/DL — SIGNIFICANT CHANGE UP (ref 0.2–1.2)
BUN SERPL-MCNC: 13 MG/DL — SIGNIFICANT CHANGE UP (ref 7–23)
CALCIUM SERPL-MCNC: 9.3 MG/DL — SIGNIFICANT CHANGE UP (ref 8.4–10.5)
CHLORIDE SERPL-SCNC: 104 MMOL/L — SIGNIFICANT CHANGE UP (ref 96–108)
CO2 SERPL-SCNC: 24 MMOL/L — SIGNIFICANT CHANGE UP (ref 22–31)
CREAT SERPL-MCNC: 0.7 MG/DL — SIGNIFICANT CHANGE UP (ref 0.5–1.3)
EGFR: 107 ML/MIN/1.73M2 — SIGNIFICANT CHANGE UP
EOSINOPHIL # BLD AUTO: 0.05 K/UL — SIGNIFICANT CHANGE UP (ref 0–0.5)
EOSINOPHIL NFR BLD AUTO: 0.8 % — SIGNIFICANT CHANGE UP (ref 0–6)
GLUCOSE SERPL-MCNC: 103 MG/DL — HIGH (ref 70–99)
HCG SERPL-ACNC: <2 MIU/ML — SIGNIFICANT CHANGE UP
HCT VFR BLD CALC: 39.5 % — SIGNIFICANT CHANGE UP (ref 34.5–45)
HGB BLD-MCNC: 13.3 G/DL — SIGNIFICANT CHANGE UP (ref 11.5–15.5)
IMM GRANULOCYTES NFR BLD AUTO: 0.5 % — SIGNIFICANT CHANGE UP (ref 0–1.5)
LYMPHOCYTES # BLD AUTO: 1.38 K/UL — SIGNIFICANT CHANGE UP (ref 1–3.3)
LYMPHOCYTES # BLD AUTO: 22.1 % — SIGNIFICANT CHANGE UP (ref 13–44)
MCHC RBC-ENTMCNC: 30.2 PG — SIGNIFICANT CHANGE UP (ref 27–34)
MCHC RBC-ENTMCNC: 33.7 GM/DL — SIGNIFICANT CHANGE UP (ref 32–36)
MCV RBC AUTO: 89.8 FL — SIGNIFICANT CHANGE UP (ref 80–100)
MONOCYTES # BLD AUTO: 0.4 K/UL — SIGNIFICANT CHANGE UP (ref 0–0.9)
MONOCYTES NFR BLD AUTO: 6.4 % — SIGNIFICANT CHANGE UP (ref 2–14)
NEUTROPHILS # BLD AUTO: 4.35 K/UL — SIGNIFICANT CHANGE UP (ref 1.8–7.4)
NEUTROPHILS NFR BLD AUTO: 69.6 % — SIGNIFICANT CHANGE UP (ref 43–77)
NRBC # BLD: 0 /100 WBCS — SIGNIFICANT CHANGE UP (ref 0–0)
NT-PROBNP SERPL-SCNC: <7 PG/ML — SIGNIFICANT CHANGE UP (ref 0–300)
PLATELET # BLD AUTO: 265 K/UL — SIGNIFICANT CHANGE UP (ref 150–400)
POTASSIUM SERPL-MCNC: 4.3 MMOL/L — SIGNIFICANT CHANGE UP (ref 3.5–5.3)
POTASSIUM SERPL-SCNC: 4.3 MMOL/L — SIGNIFICANT CHANGE UP (ref 3.5–5.3)
PROT SERPL-MCNC: 6.8 G/DL — SIGNIFICANT CHANGE UP (ref 6–8.3)
RBC # BLD: 4.4 M/UL — SIGNIFICANT CHANGE UP (ref 3.8–5.2)
RBC # FLD: 12.6 % — SIGNIFICANT CHANGE UP (ref 10.3–14.5)
SODIUM SERPL-SCNC: 138 MMOL/L — SIGNIFICANT CHANGE UP (ref 135–145)
TROPONIN T, HIGH SENSITIVITY RESULT: <6 NG/L — SIGNIFICANT CHANGE UP (ref 0–51)
TSH SERPL-MCNC: 1.97 UIU/ML — SIGNIFICANT CHANGE UP (ref 0.27–4.2)
WBC # BLD: 6.25 K/UL — SIGNIFICANT CHANGE UP (ref 3.8–10.5)
WBC # FLD AUTO: 6.25 K/UL — SIGNIFICANT CHANGE UP (ref 3.8–10.5)

## 2022-07-10 PROCEDURE — 84100 ASSAY OF PHOSPHORUS: CPT

## 2022-07-10 PROCEDURE — 84484 ASSAY OF TROPONIN QUANT: CPT

## 2022-07-10 PROCEDURE — 71046 X-RAY EXAM CHEST 2 VIEWS: CPT | Mod: 26

## 2022-07-10 PROCEDURE — 80053 COMPREHEN METABOLIC PANEL: CPT

## 2022-07-10 PROCEDURE — 93010 ELECTROCARDIOGRAM REPORT: CPT

## 2022-07-10 PROCEDURE — 93005 ELECTROCARDIOGRAM TRACING: CPT

## 2022-07-10 PROCEDURE — 83880 ASSAY OF NATRIURETIC PEPTIDE: CPT

## 2022-07-10 PROCEDURE — 84443 ASSAY THYROID STIM HORMONE: CPT

## 2022-07-10 PROCEDURE — 85025 COMPLETE CBC W/AUTO DIFF WBC: CPT

## 2022-07-10 PROCEDURE — 99285 EMERGENCY DEPT VISIT HI MDM: CPT

## 2022-07-10 PROCEDURE — 71046 X-RAY EXAM CHEST 2 VIEWS: CPT

## 2022-07-10 PROCEDURE — 99285 EMERGENCY DEPT VISIT HI MDM: CPT | Mod: 25

## 2022-07-10 PROCEDURE — 84702 CHORIONIC GONADOTROPIN TEST: CPT

## 2022-07-10 PROCEDURE — 83735 ASSAY OF MAGNESIUM: CPT

## 2022-07-10 NOTE — ED PROVIDER NOTE - PATIENT PORTAL LINK FT
You can access the FollowMyHealth Patient Portal offered by [facility] by registering at the following website: [email or web address]. By joining [employer]’s FollowMyHealth portal, you will also be able to view your health information using other applications (apps) compatible with our system.

## 2022-07-10 NOTE — ED PROVIDER NOTE - NSFOLLOWUPINSTRUCTIONS_ED_ALL_ED_FT
Follow up with your primary doctor in 1-2 days.    Follow up with your cardiologist Dr. Blake within 1 week for further evaluation/management regarding your symptoms.     Rest, stay hydrated. Continue current home medications as previously prescribed.     Return to the Emergency Department immediately if you develop any new/worsening symptoms including chest pain, shortness of breath, numbness/tingling, weakness, vomiting or any other concerning symptoms. Follow up with your primary doctor in 2-3 days.    Follow up with your cardiologist Dr. Blake within 1 week for further evaluation/management regarding your symptoms.     Rest, stay hydrated. Continue current home medications as previously prescribed.     Return to the Emergency Department immediately if you develop any new/worsening symptoms including chest pain, shortness of breath, numbness/tingling, weakness, vomiting or any other concerning symptoms.

## 2022-07-10 NOTE — ED PROVIDER NOTE - NS ED ATTENDING STATEMENT MOD
This was a shared visit with the BEBETO. I reviewed and verified the documentation and independently performed the documented:

## 2022-07-10 NOTE — ED PROVIDER NOTE - PROGRESS NOTE DETAILS
CXR clear, labs non-actionable. Pt feels well, no recurrent episodes in ED. Pt to f/u with her cardiologist Dr. Blake for continued workup. Discussed outpt Holter with patient, she will d/w Dr. Blake. Attending aware, stable for d/c. - Aden Liao PA-C Attending MD Villagran: Patient re-evaluated and feeling improved.  No acute issues at  this time.  Lab and radiology tests reviewed with patient. CXR discussed.  Patient stable for discharge. Follow up instructions given, importance of follow up emphasized, should call tomorrow for appt w Dr Blake, return to ED parameters reviewed and patient verbalized understanding.  All questions answered, all concerns addressed.

## 2022-07-10 NOTE — ED ADULT NURSE NOTE - OBJECTIVE STATEMENT
47 yr old female amb to ED with c/o "heart racing." Has hx Psoriatic arthritis. Takes med every month Denies chest pain or sob Denies fever or chills Palpitations started 7/6 dad has hx of abnorm rhythm "electrical" and needed Ablation. C/o feeling lightheaded and min sweating when event occurs Resolves itself LMP 6/18 Denies abd pain/n/v/d Denies burn or diff urinating

## 2022-07-10 NOTE — ED PROVIDER NOTE - CARE PROVIDER_API CALL
Anthony Blake)  Cardiovascular Disease; Nuclear Cardiology  [address] [place] [address], [address]  [place] [address]  Phone: [phone]  Fax: [phone]  Established Patient  Follow Up Time: 4-6 Days

## 2022-07-10 NOTE — ED PROVIDER NOTE - ATTENDING APP SHARED VISIT CONTRIBUTION OF CARE
Attending MD Villagran:   I personally have seen and examined this patient.  Physician assistant note reviewed and agree on plan of care and except where noted.  See below for details.     Seen in [place] 52  Cards Dr. Padron    47F with PMH/PSH including psoriatic arthritis, s/p C section, s/p R breast cyst excision presents to the ED with last night "heart was racing all night".  Reports dad history of an "electrical thing" for which he needed "an ablation".  Reports had had palpitations during the week starting on 7/6/22.  Reports at that time would only last for a couple of minutes and would spontaneously resolve.  Reports since then would have intermittently. Denies shortness of breath, chest pain.  Reports mild associated lightheadedness and "sweaty".  Reports lightheadedness happened with laying down.  Reports symptoms were present on arrival and have since stopped.  Denies change in vision, double vision, sudden loss of vision, headache.  Reports has had previous episodes of palpitations, denies recent episode.  Reports has had previous Echo, Stress.  Reports had pre surgery because "left side of heart was enlarged".  Denies abdominal pain, nausea, vomiting, diarrhea, bloody or black stools.     TO BE COMPLETED Attending MD Villagran:   I personally have seen and examined this patient.  Physician assistant note reviewed and agree on plan of care and except where noted.  See below for details.     Seen in [place] 52  Cards Dr. Blake    47F with PMH/PSH including psoriatic arthritis, s/p C section, s/p R breast cyst excision presents to the ED with last night "heart was racing all night" with mild associated lightheadedness and "sweaty".  .  Reports had palpitations during the week starting on 7/6/22.  Reports at that time would only last for a couple of minutes and would spontaneously resolve.  Reports since then would have intermittently. Denies shortness of breath, chest pain.  Reports lightheadedness happened while laying down.  Reports symptoms were present on arrival and have since stopped.  Denies change in vision, double vision, sudden loss of vision, headache.  Reports has had previous episodes of palpitations, denies recent episode.  Reports has had previous Echo, Stress.  Reports had pre surgery because "left side of heart was enlarged".  Denies abdominal pain, nausea, vomiting, diarrhea, bloody or black stools.  Denies dysuria, hematuria, change in urinary habits including frequency, urgency.  Denies recent illness, fevers, chills, sick contacts.  FHx reports dad history of an "electrical thing" for which he needed "an ablation".  Denies history of thyroid disease.  Denies unilateral leg swelling, hemoptysis, recent surgery or trauma, prior PE or DVT, hormone use. Former tobacco, quit 2013.      Exam:   General: NAD  HENT: head NCAT, airway patent  Eyes: PERRL  Lungs: lungs CTAB with good inspiratory effort, no wheezing, no rhonchi, no rales  Cardiac: +S1S2, no obvious m/r/g, regular   GI: abdomen soft with +BS, NT, ND  : no CVAT  MSK: FROM at neck, no tenderness to midline palpation, no calf tenderness, swelling, erythema or warmth  Neuro: moving all extremities spontaneously, sensory grossly intact, no gross neuro deficits  Psych: normal mood and affect     A/P: 47F with palpitations, DDx arrhythmia, thyroid dysfunction, metabolic derangement, will obtain labs, CXR, EKG, cardiac monitor

## 2022-07-11 PROBLEM — L40.50 ARTHROPATHIC PSORIASIS, UNSPECIFIED: Chronic | Status: ACTIVE | Noted: 2022-07-10

## 2022-07-13 ENCOUNTER — APPOINTMENT (OUTPATIENT)
Dept: CARDIOLOGY | Facility: CLINIC | Age: 47
End: 2022-07-13

## 2022-07-13 ENCOUNTER — NON-APPOINTMENT (OUTPATIENT)
Age: 47
End: 2022-07-13

## 2022-07-13 VITALS
WEIGHT: 202 LBS | HEIGHT: 67 IN | OXYGEN SATURATION: 97 % | DIASTOLIC BLOOD PRESSURE: 83 MMHG | BODY MASS INDEX: 31.71 KG/M2 | RESPIRATION RATE: 12 BRPM | HEART RATE: 93 BPM | SYSTOLIC BLOOD PRESSURE: 145 MMHG

## 2022-07-13 VITALS — DIASTOLIC BLOOD PRESSURE: 80 MMHG | SYSTOLIC BLOOD PRESSURE: 128 MMHG

## 2022-07-13 PROCEDURE — 99214 OFFICE O/P EST MOD 30 MIN: CPT | Mod: 25

## 2022-07-13 PROCEDURE — 93000 ELECTROCARDIOGRAM COMPLETE: CPT

## 2022-07-14 LAB — DEPRECATED D DIMER PPP IA-ACNC: <150 NG/ML DDU

## 2022-07-25 ENCOUNTER — APPOINTMENT (OUTPATIENT)
Dept: CARDIOLOGY | Facility: CLINIC | Age: 47
End: 2022-07-25

## 2022-07-25 PROCEDURE — 93306 TTE W/DOPPLER COMPLETE: CPT

## 2022-07-25 NOTE — HISTORY OF PRESENT ILLNESS
[FreeTextEntry1] : Patient is a 47 year old woman with a history of psoriatic arthritis, family history of coronary artery disease, former tobacco use, dyslipidemia, and elevated blood pressure in the past who presents for evaluation of chest pain. She experienced palpitations last week with associated chest discomfort at which time she went to the ER and she had a negative Troponin. She states that she has been under increased stress. She states that she has been drinking water, but knows that she needs to drink more water. She would like a D-Dimer to be checked. She otherwise denies any dyspnea, headaches, and dizziness.  Finasteride Pregnancy And Lactation Text: This medication is absolutely contraindicated during pregnancy. It is unknown if it is excreted in breast milk.

## 2022-07-25 NOTE — DISCUSSION/SUMMARY
[EKG obtained to assist in diagnosis and management of assessed problem(s)] : EKG obtained to assist in diagnosis and management of assessed problem(s) [FreeTextEntry1] : IMPRESSION: Ms. Curry is a 47 year old woman with a history of psoriatic arthritis, family history of coronary artery disease, former tobacco use, dyslipidemia, and elevated blood pressure in the past who presents for evaluation of chest pain.\par \par PLAN:\par 1. Her chest discomfort is atypical. She had a negative Troponin when she went to the ER. She would like a D-Dimer to be checked today. She will schedule an echocardiogram given her chest discomfort and palpitations. Will discuss scheduling her for a stress test or a Cardiac CT after her echocardiogram has been performed. \par 2. She will have a 1 week ZIO patch placed today given her palpitations. She did not have any ectopy on exam or on her ECG that was performed today. \par 3. Her blood pressure was fine when it was repeated at the time of the physical examination. She will continue on a low sodium diet for now. \par 4. She will modify her diet given her dyslipidemia and she will follow up with you for a complete physical at which time she should have a lipid profile checked.\par 5. She will follow up after her cardiac testing is performed or sooner if she is symptomatic.

## 2022-11-03 ENCOUNTER — APPOINTMENT (OUTPATIENT)
Dept: INTERNAL MEDICINE | Facility: CLINIC | Age: 47
End: 2022-11-03

## 2022-11-03 VITALS
RESPIRATION RATE: 12 BRPM | DIASTOLIC BLOOD PRESSURE: 81 MMHG | SYSTOLIC BLOOD PRESSURE: 135 MMHG | TEMPERATURE: 97.2 F | HEART RATE: 85 BPM | HEIGHT: 67 IN | OXYGEN SATURATION: 99 % | WEIGHT: 200 LBS | BODY MASS INDEX: 31.39 KG/M2

## 2022-11-03 DIAGNOSIS — F17.200 NICOTINE DEPENDENCE, UNSPECIFIED, UNCOMPLICATED: ICD-10-CM

## 2022-11-03 PROCEDURE — 99396 PREV VISIT EST AGE 40-64: CPT

## 2022-11-03 RX ORDER — CLONAZEPAM 0.25 MG/1
0.25 TABLET, ORALLY DISINTEGRATING ORAL
Qty: 30 | Refills: 0 | Status: DISCONTINUED | COMMUNITY
Start: 2018-10-29 | End: 2022-11-03

## 2022-11-03 RX ORDER — BACILLUS COAGULANS/INULIN 1B-250 MG
CAPSULE ORAL
Refills: 0 | Status: DISCONTINUED | COMMUNITY
End: 2022-11-03

## 2022-11-03 RX ORDER — ACETAMINOPHEN 325 MG
TABLET ORAL
Refills: 0 | Status: DISCONTINUED | COMMUNITY
End: 2022-11-03

## 2022-11-03 NOTE — ASSESSMENT
[FreeTextEntry1] : CPE OF 47 Y OLD FEM = LABS \par F/U CARDIO \par F/U GYN AND MAMMO RECOMM \par F/U OPHTHA AND GI FOR COLON CANCER SCREENING \par DECLINES INFLUENzA OR COVID-19 BOOSTER\par RTO 1 Y

## 2022-11-03 NOTE — PHYSICAL EXAM

## 2022-11-04 LAB
25(OH)D3 SERPL-MCNC: 29.4 NG/ML
ALBUMIN SERPL ELPH-MCNC: 4.4 G/DL
ALP BLD-CCNC: 70 U/L
ALT SERPL-CCNC: 15 U/L
ANION GAP SERPL CALC-SCNC: 10 MMOL/L
APPEARANCE: CLEAR
AST SERPL-CCNC: 18 U/L
BASOPHILS # BLD AUTO: 0.03 K/UL
BASOPHILS NFR BLD AUTO: 0.3 %
BILIRUB SERPL-MCNC: 0.2 MG/DL
BILIRUBIN URINE: NEGATIVE
BLOOD URINE: NEGATIVE
BUN SERPL-MCNC: 12 MG/DL
CALCIUM SERPL-MCNC: 9.9 MG/DL
CHLORIDE SERPL-SCNC: 102 MMOL/L
CHOLEST SERPL-MCNC: 213 MG/DL
CO2 SERPL-SCNC: 26 MMOL/L
COLOR: NORMAL
CREAT SERPL-MCNC: 0.87 MG/DL
EGFR: 82 ML/MIN/1.73M2
EOSINOPHIL # BLD AUTO: 0.04 K/UL
EOSINOPHIL NFR BLD AUTO: 0.5 %
GLUCOSE QUALITATIVE U: NORMAL
GLUCOSE SERPL-MCNC: 97 MG/DL
HCT VFR BLD CALC: 42.3 %
HDLC SERPL-MCNC: 60 MG/DL
HGB BLD-MCNC: 14.1 G/DL
IMM GRANULOCYTES NFR BLD AUTO: 0.2 %
KETONES URINE: NEGATIVE
LDLC SERPL CALC-MCNC: 132 MG/DL
LEUKOCYTE ESTERASE URINE: NEGATIVE
LYMPHOCYTES # BLD AUTO: 1.87 K/UL
LYMPHOCYTES NFR BLD AUTO: 21.8 %
MAN DIFF?: NORMAL
MCHC RBC-ENTMCNC: 30.2 PG
MCHC RBC-ENTMCNC: 33.3 GM/DL
MCV RBC AUTO: 90.6 FL
MONOCYTES # BLD AUTO: 0.58 K/UL
MONOCYTES NFR BLD AUTO: 6.8 %
NEUTROPHILS # BLD AUTO: 6.04 K/UL
NEUTROPHILS NFR BLD AUTO: 70.4 %
NITRITE URINE: NEGATIVE
NONHDLC SERPL-MCNC: 154 MG/DL
PH URINE: 6
PLATELET # BLD AUTO: 295 K/UL
POTASSIUM SERPL-SCNC: 4.8 MMOL/L
PROT SERPL-MCNC: 6.8 G/DL
PROTEIN URINE: NEGATIVE
RBC # BLD: 4.67 M/UL
RBC # FLD: 12.7 %
SODIUM SERPL-SCNC: 138 MMOL/L
SPECIFIC GRAVITY URINE: 1.02
TRIGL SERPL-MCNC: 106 MG/DL
TSH SERPL-ACNC: 1.23 UIU/ML
UROBILINOGEN URINE: NORMAL
WBC # FLD AUTO: 8.58 K/UL

## 2022-11-08 ENCOUNTER — APPOINTMENT (OUTPATIENT)
Dept: CARDIOLOGY | Facility: CLINIC | Age: 47
End: 2022-11-08
Payer: COMMERCIAL

## 2022-11-08 ENCOUNTER — NON-APPOINTMENT (OUTPATIENT)
Age: 47
End: 2022-11-08

## 2022-11-08 VITALS
WEIGHT: 200 LBS | HEART RATE: 79 BPM | SYSTOLIC BLOOD PRESSURE: 122 MMHG | RESPIRATION RATE: 12 BRPM | DIASTOLIC BLOOD PRESSURE: 80 MMHG | TEMPERATURE: 97.5 F | BODY MASS INDEX: 31.39 KG/M2 | HEIGHT: 67 IN | OXYGEN SATURATION: 98 %

## 2022-11-08 DIAGNOSIS — R00.2 PALPITATIONS: ICD-10-CM

## 2022-11-08 PROCEDURE — 99213 OFFICE O/P EST LOW 20 MIN: CPT | Mod: 25

## 2022-11-08 PROCEDURE — 93000 ELECTROCARDIOGRAM COMPLETE: CPT

## 2022-11-10 ENCOUNTER — NON-APPOINTMENT (OUTPATIENT)
Age: 47
End: 2022-11-10

## 2023-01-14 ENCOUNTER — NON-APPOINTMENT (OUTPATIENT)
Age: 48
End: 2023-01-14

## 2023-01-14 NOTE — DISCUSSION/SUMMARY
[FreeTextEntry1] : IMPRESSION: Ms. Curry is a 47 year old woman with a history of psoriatic arthritis, family history of coronary artery disease, former tobacco use, dyslipidemia, and elevated blood pressure in the past who presents for evaluation of palpitations\par \par PLAN:\par 1. She has not have any chest pain since her last visit with me and she has had infrequent palpitations. We discussed a possible stress test or cardiac CT, however, she wants to hold off as she has been feeling well. She was in sinus rhythm on her ECG that was performed today without any ectopy. \par 2. Her blood pressure is good, thus she will continue on a low sodium diet for now. \par 3. She will modify her diet given her dyslipidemia. Her most recent LDL had trended up.\par 4. She will follow up with me in 6 months or sooner should she experience any symptoms in the interim.  [EKG obtained to assist in diagnosis and management of assessed problem(s)] : EKG obtained to assist in diagnosis and management of assessed problem(s)

## 2023-01-14 NOTE — CARDIOLOGY SUMMARY
[___] : [unfilled] [LVEF ___%] : LVEF [unfilled]% [None] : no pulmonary hypertension [de-identified] : 7/25/2022: Endocardium not well visualized; grossly normal left ventricular systolic function. EF is approximately 60-65%. Mild tricuspid regurgitation. No pulmonary HTN. PASP= 22 mmHg.

## 2023-01-14 NOTE — HISTORY OF PRESENT ILLNESS
[FreeTextEntry1] : Patient is a 47 year old woman with a history of psoriatic arthritis, family history of coronary artery disease, former tobacco use, dyslipidemia, and elevated blood pressure in the past who presents today for follow up of palpitations. She states tat she has been feeling better than at the time of her last visit. She has very infrequent palpitations and denies any chest pain, dyspnea, headaches, and dizziness.

## 2023-01-15 NOTE — PHYSICAL EXAM
[General Appearance - Well Developed] : well developed [Normal Appearance] : normal appearance [Well Groomed] : well groomed [General Appearance - Well Nourished] : well nourished [No Deformities] : no deformities [General Appearance - In No Acute Distress] : no acute distress [Normal Conjunctiva] : the conjunctiva exhibited no abnormalities [Normal Jugular Venous A Waves Present] : normal jugular venous A waves present [Normal Jugular Venous V Waves Present] : normal jugular venous V waves present [Respiration, Rhythm And Depth] : normal respiratory rhythm and effort [Exaggerated Use Of Accessory Muscles For Inspiration] : no accessory muscle use [Auscultation Breath Sounds / Voice Sounds] : lungs were clear to auscultation bilaterally [Normal Rate] : normal [Rhythm Regular] : regular [Normal S1] : normal S1 [Normal S2] : normal S2 [II] : a grade 2 [No Pitting Edema] : no pitting edema present [Bowel Sounds] : normal bowel sounds [Abdomen Soft] : soft [Abdomen Tenderness] : non-tender [Abnormal Walk] : normal gait [Gait - Sufficient For Exercise Testing] : the gait was sufficient for exercise testing [Nail Clubbing] : no clubbing of the fingernails [Cyanosis, Localized] : no localized cyanosis [Petechial Hemorrhages (___cm)] : no petechial hemorrhages [Skin Color & Pigmentation] : normal skin color and pigmentation [] : no rash [No Skin Ulcers] : no skin ulcer [Oriented To Time, Place, And Person] : oriented to person, place, and time [Affect] : the affect was normal [Mood] : the mood was normal [No Anxiety] : not feeling anxious [FreeTextEntry1] : She was wearing a face mask during the examination.  [S3] : no S3 [Right Carotid Bruit] : no bruit heard over the right carotid [Left Carotid Bruit] : no bruit heard over the left carotid [Bruit] : no bruit heard I have reviewed and confirmed nurses' notes for patient's medications, allergies, medical history, and surgical history.

## 2023-01-30 ENCOUNTER — NON-APPOINTMENT (OUTPATIENT)
Age: 48
End: 2023-01-30

## 2023-02-12 ENCOUNTER — NON-APPOINTMENT (OUTPATIENT)
Age: 48
End: 2023-02-12

## 2023-04-26 DIAGNOSIS — R22.1 LOCALIZED SWELLING, MASS AND LUMP, NECK: ICD-10-CM

## 2023-05-26 NOTE — ED ADULT TRIAGE NOTE - STATUS:
Applied Detail Level: Simple Render Risk Assessment In Note?: no Additional Notes: Discontinue doxycycline and topicals \\nPending blood work

## 2023-06-19 ENCOUNTER — APPOINTMENT (OUTPATIENT)
Dept: ULTRASOUND IMAGING | Facility: CLINIC | Age: 48
End: 2023-06-19
Payer: COMMERCIAL

## 2023-06-19 ENCOUNTER — OUTPATIENT (OUTPATIENT)
Dept: OUTPATIENT SERVICES | Facility: HOSPITAL | Age: 48
LOS: 1 days | End: 2023-06-19
Payer: COMMERCIAL

## 2023-06-19 DIAGNOSIS — Z00.8 ENCOUNTER FOR OTHER GENERAL EXAMINATION: ICD-10-CM

## 2023-06-19 DIAGNOSIS — Z98.891 HISTORY OF UTERINE SCAR FROM PREVIOUS SURGERY: Chronic | ICD-10-CM

## 2023-06-19 DIAGNOSIS — E04.1 NONTOXIC SINGLE THYROID NODULE: ICD-10-CM

## 2023-06-19 PROCEDURE — 76536 US EXAM OF HEAD AND NECK: CPT

## 2023-06-19 PROCEDURE — 76536 US EXAM OF HEAD AND NECK: CPT | Mod: 26

## 2023-06-26 ENCOUNTER — NON-APPOINTMENT (OUTPATIENT)
Age: 48
End: 2023-06-26

## 2023-06-27 ENCOUNTER — APPOINTMENT (OUTPATIENT)
Dept: SURGERY | Facility: CLINIC | Age: 48
End: 2023-06-27
Payer: COMMERCIAL

## 2023-06-27 DIAGNOSIS — Z00.00 ENCOUNTER FOR GENERAL ADULT MEDICAL EXAMINATION W/OUT ABNORMAL FINDINGS: ICD-10-CM

## 2023-06-27 DIAGNOSIS — E04.1 NONTOXIC SINGLE THYROID NODULE: ICD-10-CM

## 2023-06-27 PROCEDURE — 99214 OFFICE O/P EST MOD 30 MIN: CPT | Mod: 25

## 2023-06-27 NOTE — PHYSICAL EXAM
[de-identified] : no palpable thyroid nodules [Laryngoscopy Performed] : laryngoscopy was performed, see procedure section for findings [Midline] : located in midline position [Normal] : orientation to person, place, and time: normal

## 2023-06-27 NOTE — HISTORY OF PRESENT ILLNESS
[de-identified] : prior evaluation of subcentimeter thyroid nodule. denies dysphagia, hoarseness or new lesions. no changes medically since last visit. recent sonogram unchanged.  I have reviewed all old and new data and available images.\par

## 2023-06-28 LAB
T3 SERPL-MCNC: 81 NG/DL
T4 FREE SERPL-MCNC: 1.1 NG/DL
THYROGLOB AB SERPL-ACNC: <20 IU/ML
THYROPEROXIDASE AB SERPL IA-ACNC: 16.8 IU/ML
TSH SERPL-ACNC: 1.42 UIU/ML

## 2023-07-03 ENCOUNTER — NON-APPOINTMENT (OUTPATIENT)
Age: 48
End: 2023-07-03

## 2023-10-12 ENCOUNTER — APPOINTMENT (OUTPATIENT)
Dept: ORTHOPEDIC SURGERY | Facility: CLINIC | Age: 48
End: 2023-10-12
Payer: COMMERCIAL

## 2023-10-12 VITALS
DIASTOLIC BLOOD PRESSURE: 88 MMHG | SYSTOLIC BLOOD PRESSURE: 142 MMHG | WEIGHT: 200 LBS | OXYGEN SATURATION: 98 % | HEART RATE: 80 BPM | BODY MASS INDEX: 31.39 KG/M2 | HEIGHT: 67 IN

## 2023-10-12 PROCEDURE — 20611 DRAIN/INJ JOINT/BURSA W/US: CPT | Mod: LT

## 2023-10-12 PROCEDURE — 73564 X-RAY EXAM KNEE 4 OR MORE: CPT | Mod: LT

## 2023-10-12 PROCEDURE — 99204 OFFICE O/P NEW MOD 45 MIN: CPT | Mod: 25

## 2023-10-19 ENCOUNTER — APPOINTMENT (OUTPATIENT)
Dept: MRI IMAGING | Facility: CLINIC | Age: 48
End: 2023-10-19

## 2023-10-25 ENCOUNTER — OUTPATIENT (OUTPATIENT)
Dept: OUTPATIENT SERVICES | Facility: HOSPITAL | Age: 48
LOS: 1 days | End: 2023-10-25
Payer: COMMERCIAL

## 2023-10-25 ENCOUNTER — APPOINTMENT (OUTPATIENT)
Dept: MRI IMAGING | Facility: CLINIC | Age: 48
End: 2023-10-25
Payer: COMMERCIAL

## 2023-10-25 DIAGNOSIS — Z98.891 HISTORY OF UTERINE SCAR FROM PREVIOUS SURGERY: Chronic | ICD-10-CM

## 2023-10-25 DIAGNOSIS — M25.562 PAIN IN LEFT KNEE: ICD-10-CM

## 2023-10-25 DIAGNOSIS — Z00.8 ENCOUNTER FOR OTHER GENERAL EXAMINATION: ICD-10-CM

## 2023-10-25 PROCEDURE — 73721 MRI JNT OF LWR EXTRE W/O DYE: CPT | Mod: 26,LT

## 2023-10-25 PROCEDURE — 73721 MRI JNT OF LWR EXTRE W/O DYE: CPT

## 2023-11-01 ENCOUNTER — APPOINTMENT (OUTPATIENT)
Dept: ORTHOPEDIC SURGERY | Facility: CLINIC | Age: 48
End: 2023-11-01
Payer: COMMERCIAL

## 2023-11-01 DIAGNOSIS — M25.562 PAIN IN LEFT KNEE: ICD-10-CM

## 2023-11-01 PROCEDURE — 99214 OFFICE O/P EST MOD 30 MIN: CPT

## 2023-11-02 PROBLEM — M25.562 ACUTE PAIN OF LEFT KNEE: Status: ACTIVE | Noted: 2023-10-12

## 2023-11-09 ENCOUNTER — APPOINTMENT (OUTPATIENT)
Dept: ORTHOPEDIC SURGERY | Facility: CLINIC | Age: 48
End: 2023-11-09

## 2023-11-20 ENCOUNTER — APPOINTMENT (OUTPATIENT)
Dept: NEUROLOGY | Facility: CLINIC | Age: 48
End: 2023-11-20
Payer: COMMERCIAL

## 2023-11-20 DIAGNOSIS — M79.89 OTHER SPECIFIED SOFT TISSUE DISORDERS: ICD-10-CM

## 2023-11-20 PROCEDURE — 99204 OFFICE O/P NEW MOD 45 MIN: CPT

## 2024-02-15 ENCOUNTER — APPOINTMENT (OUTPATIENT)
Dept: NEUROLOGY | Facility: CLINIC | Age: 49
End: 2024-02-15
Payer: COMMERCIAL

## 2024-02-15 DIAGNOSIS — R20.0 ANESTHESIA OF SKIN: ICD-10-CM

## 2024-02-15 PROCEDURE — 99214 OFFICE O/P EST MOD 30 MIN: CPT

## 2024-02-15 NOTE — PHYSICAL EXAM
[Person] : oriented to person [Place] : oriented to place [Time] : oriented to time [Fluency] : fluency intact [Cranial Nerves Optic (II)] : visual acuity intact bilaterally,  visual fields full to confrontation, pupils equal round and reactive to light [Cranial Nerves Oculomotor (III)] : extraocular motion intact [Cranial Nerves Trigeminal (V)] : facial sensation intact symmetrically [Cranial Nerves Facial (VII)] : face symmetrical [Cranial Nerves Vestibulocochlear (VIII)] : hearing was intact bilaterally [Cranial Nerves Glossopharyngeal (IX)] : tongue and palate midline [Cranial Nerves Accessory (XI - Cranial And Spinal)] : head turning and shoulder shrug symmetric [Cranial Nerves Hypoglossal (XII)] : there was no tongue deviation with protrusion [Motor Tone] : muscle tone was normal in all four extremities [Motor Strength] : muscle strength was normal in all four extremities [No Muscle Atrophy] : normal bulk in all four extremities [Abnormal Walk] : normal gait [2+] : Ankle jerk left 2+

## 2024-02-15 NOTE — HISTORY OF PRESENT ILLNESS
[FreeTextEntry1] : Had knee surgery on 1/25/24 for meniscus repair of the left knee. Post-operatively she noticed that she was having numbness in her groin (described as in her vagina and in her anus) as well as the sole of her right foot. The sole of the foot also sometimes with sharp pains. When wiping after using the bathroom, feels numb. Also noticing increased urinary urgency but when she goes to the bathroom not much is coming out.   Initial HPI This is a 48F who presents with bilateral feet swelling.  2 months ago she had an episode of swelling of the foot and calf. At that time she also had neuropathic pain and numbness in that foot, but by the next day the numbness resolved. 2 weeks later the swelling resolved on its own as well. She has seen cardiologist, vascular surgeon, rheumatologist, and hematologist; workup negative to date. 3 weeks ago she developed swelling of the left foot without accompanying pain or numbness, which has since self resolved on its own.  Aside from when the right leg was swollen, she has had no other instances of numbness, tingling, or weakness in the feet.  She currently is only taking Taltz for psoriatic arthritis. Did not have medication changes when these events occured.

## 2024-02-15 NOTE — ASSESSMENT
[FreeTextEntry1] : This is a 48F who had left meniscus repair on 1/25/24 c/b post operative saddle anesthesia. Will get imaging as concerned for S2-4 radiculopathy.  - MRI L Spine - If negative, will imagine neuroaxis to rule out central lesion

## 2024-02-16 ENCOUNTER — INPATIENT (INPATIENT)
Facility: HOSPITAL | Age: 49
LOS: 0 days | Discharge: HOME | DRG: 92 | End: 2024-02-17
Attending: NEUROMUSCULOSKELETAL MEDICINE & OMM | Admitting: NEUROMUSCULOSKELETAL MEDICINE & OMM
Payer: COMMERCIAL

## 2024-02-16 VITALS
OXYGEN SATURATION: 98 % | WEIGHT: 190.04 LBS | RESPIRATION RATE: 15 BRPM | SYSTOLIC BLOOD PRESSURE: 162 MMHG | TEMPERATURE: 99 F | HEART RATE: 89 BPM | HEIGHT: 67 IN | DIASTOLIC BLOOD PRESSURE: 115 MMHG

## 2024-02-16 DIAGNOSIS — R20.0 ANESTHESIA OF SKIN: ICD-10-CM

## 2024-02-16 DIAGNOSIS — Z98.891 HISTORY OF UTERINE SCAR FROM PREVIOUS SURGERY: Chronic | ICD-10-CM

## 2024-02-16 LAB
ALBUMIN SERPL ELPH-MCNC: 4.5 G/DL — SIGNIFICANT CHANGE UP (ref 3.3–5)
ALP SERPL-CCNC: 77 U/L — SIGNIFICANT CHANGE UP (ref 40–120)
ALT FLD-CCNC: 20 U/L — SIGNIFICANT CHANGE UP (ref 10–45)
ANION GAP SERPL CALC-SCNC: 14 MMOL/L — SIGNIFICANT CHANGE UP (ref 5–17)
APTT BLD: 31.1 SEC — SIGNIFICANT CHANGE UP (ref 24.5–35.6)
AST SERPL-CCNC: 15 U/L — SIGNIFICANT CHANGE UP (ref 10–40)
BASE EXCESS BLDV CALC-SCNC: 1.6 MMOL/L — SIGNIFICANT CHANGE UP (ref -2–3)
BASOPHILS # BLD AUTO: 0.03 K/UL — SIGNIFICANT CHANGE UP (ref 0–0.2)
BASOPHILS NFR BLD AUTO: 0.4 % — SIGNIFICANT CHANGE UP (ref 0–2)
BILIRUB SERPL-MCNC: 0.4 MG/DL — SIGNIFICANT CHANGE UP (ref 0.2–1.2)
BUN SERPL-MCNC: 16 MG/DL — SIGNIFICANT CHANGE UP (ref 7–23)
CA-I SERPL-SCNC: 1.26 MMOL/L — SIGNIFICANT CHANGE UP (ref 1.15–1.33)
CALCIUM SERPL-MCNC: 10 MG/DL — SIGNIFICANT CHANGE UP (ref 8.4–10.5)
CHLORIDE BLDV-SCNC: 103 MMOL/L — SIGNIFICANT CHANGE UP (ref 96–108)
CHLORIDE SERPL-SCNC: 103 MMOL/L — SIGNIFICANT CHANGE UP (ref 96–108)
CO2 BLDV-SCNC: 30 MMOL/L — HIGH (ref 22–26)
CO2 SERPL-SCNC: 24 MMOL/L — SIGNIFICANT CHANGE UP (ref 22–31)
CREAT SERPL-MCNC: 0.85 MG/DL — SIGNIFICANT CHANGE UP (ref 0.5–1.3)
EGFR: 84 ML/MIN/1.73M2 — SIGNIFICANT CHANGE UP
EOSINOPHIL # BLD AUTO: 0.07 K/UL — SIGNIFICANT CHANGE UP (ref 0–0.5)
EOSINOPHIL NFR BLD AUTO: 1 % — SIGNIFICANT CHANGE UP (ref 0–6)
GAS PNL BLDV: 138 MMOL/L — SIGNIFICANT CHANGE UP (ref 136–145)
GAS PNL BLDV: SIGNIFICANT CHANGE UP
GAS PNL BLDV: SIGNIFICANT CHANGE UP
GLUCOSE BLDV-MCNC: 97 MG/DL — SIGNIFICANT CHANGE UP (ref 70–99)
GLUCOSE SERPL-MCNC: 102 MG/DL — HIGH (ref 70–99)
HCO3 BLDV-SCNC: 29 MMOL/L — SIGNIFICANT CHANGE UP (ref 22–29)
HCT VFR BLD CALC: 45.1 % — HIGH (ref 34.5–45)
HCT VFR BLDA CALC: 45 % — SIGNIFICANT CHANGE UP (ref 34.5–46.5)
HGB BLD CALC-MCNC: 14.9 G/DL — SIGNIFICANT CHANGE UP (ref 11.7–16.1)
HGB BLD-MCNC: 14.7 G/DL — SIGNIFICANT CHANGE UP (ref 11.5–15.5)
IMM GRANULOCYTES NFR BLD AUTO: 0.3 % — SIGNIFICANT CHANGE UP (ref 0–0.9)
INR BLD: 1.05 RATIO — SIGNIFICANT CHANGE UP (ref 0.85–1.18)
LACTATE BLDV-MCNC: 1.7 MMOL/L — SIGNIFICANT CHANGE UP (ref 0.5–2)
LIDOCAIN IGE QN: 25 U/L — SIGNIFICANT CHANGE UP (ref 7–60)
LYMPHOCYTES # BLD AUTO: 1.86 K/UL — SIGNIFICANT CHANGE UP (ref 1–3.3)
LYMPHOCYTES # BLD AUTO: 25.9 % — SIGNIFICANT CHANGE UP (ref 13–44)
MCHC RBC-ENTMCNC: 29.8 PG — SIGNIFICANT CHANGE UP (ref 27–34)
MCHC RBC-ENTMCNC: 32.6 GM/DL — SIGNIFICANT CHANGE UP (ref 32–36)
MCV RBC AUTO: 91.5 FL — SIGNIFICANT CHANGE UP (ref 80–100)
MONOCYTES # BLD AUTO: 0.49 K/UL — SIGNIFICANT CHANGE UP (ref 0–0.9)
MONOCYTES NFR BLD AUTO: 6.8 % — SIGNIFICANT CHANGE UP (ref 2–14)
NEUTROPHILS # BLD AUTO: 4.7 K/UL — SIGNIFICANT CHANGE UP (ref 1.8–7.4)
NEUTROPHILS NFR BLD AUTO: 65.6 % — SIGNIFICANT CHANGE UP (ref 43–77)
NRBC # BLD: 0 /100 WBCS — SIGNIFICANT CHANGE UP (ref 0–0)
PCO2 BLDV: 53 MMHG — HIGH (ref 39–42)
PH BLDV: 7.34 — SIGNIFICANT CHANGE UP (ref 7.32–7.43)
PLATELET # BLD AUTO: 298 K/UL — SIGNIFICANT CHANGE UP (ref 150–400)
PO2 BLDV: 21 MMHG — LOW (ref 25–45)
POTASSIUM BLDV-SCNC: 3.6 MMOL/L — SIGNIFICANT CHANGE UP (ref 3.5–5.1)
POTASSIUM SERPL-MCNC: 3.9 MMOL/L — SIGNIFICANT CHANGE UP (ref 3.5–5.3)
POTASSIUM SERPL-SCNC: 3.9 MMOL/L — SIGNIFICANT CHANGE UP (ref 3.5–5.3)
PROT SERPL-MCNC: 7.5 G/DL — SIGNIFICANT CHANGE UP (ref 6–8.3)
PROTHROM AB SERPL-ACNC: 11 SEC — SIGNIFICANT CHANGE UP (ref 9.5–13)
RBC # BLD: 4.93 M/UL — SIGNIFICANT CHANGE UP (ref 3.8–5.2)
RBC # FLD: 12 % — SIGNIFICANT CHANGE UP (ref 10.3–14.5)
SAO2 % BLDV: 37.6 % — LOW (ref 67–88)
SODIUM SERPL-SCNC: 141 MMOL/L — SIGNIFICANT CHANGE UP (ref 135–145)
WBC # BLD: 7.17 K/UL — SIGNIFICANT CHANGE UP (ref 3.8–10.5)
WBC # FLD AUTO: 7.17 K/UL — SIGNIFICANT CHANGE UP (ref 3.8–10.5)

## 2024-02-16 PROCEDURE — 72148 MRI LUMBAR SPINE W/O DYE: CPT | Mod: 26

## 2024-02-16 PROCEDURE — 72141 MRI NECK SPINE W/O DYE: CPT | Mod: 26

## 2024-02-16 PROCEDURE — 99285 EMERGENCY DEPT VISIT HI MDM: CPT

## 2024-02-16 PROCEDURE — 72146 MRI CHEST SPINE W/O DYE: CPT | Mod: 26

## 2024-02-16 NOTE — ED ADULT NURSE NOTE - OBJECTIVE STATEMENT
Pt is a 48y F p/w L ankle pain. Pt reports L ankle pain radiating to foot a/w decreased posterior sensation x 2 weeks. DP intact. Denies incontinence, back pain. A&Ox4, HAND, lungs clear, distal pulses intact, abdomen soft, skin intact. Side rails up for safety, call bell and personal items within reach, instructed to call for assistance, verbalizes understanding. Will continue to monitor.

## 2024-02-16 NOTE — ED ADULT TRIAGE NOTE - CHIEF COMPLAINT QUOTE
s/p left meniscus repair 1-26 since numbness in right foot up the leg and I the groin vagina sent in for cord compression  and for an MRI

## 2024-02-16 NOTE — CONSULT NOTE ADULT - SUBJECTIVE AND OBJECTIVE BOX
p (2140)     HPI: Allen Curry  48F Hx L knee meniscus repair 24 p/f 2w R foot numbness. MR cord compression w/moderate stensosis C5/6 from disc, T2 signal @T6 in posterior column, L spine w/o stenosis.  Attests to loss of sensation to groin/genitals, rectal tone intact per ED. Denied radicular pain, clumsy hands, abnormal gait.   Exam: HAND 5/5, decreased sensation along R sural n, normoreflexive, proprioception intact     =====================  PAST MEDICAL HISTORY   Psoriatic arthritis      PAST SURGICAL HISTORY   H/O  section      sulfa drugs (Angioedema)  penicillin (Rash)  Ancef (Urticaria)  Bactrim (Swelling)      MEDICATIONS:  Antibiotics:    Neuro:    Other:      SOCIAL HISTORY:   Occupation:   Marital Status:     FAMILY HISTORY:      ROS: Negative except per HPI    LABS:  PT/INR - ( 2024 17:06 )   PT: 11.0 sec;   INR: 1.05 ratio         PTT - ( 2024 17:06 )  PTT:31.1 sec                        14.7   7.17  )-----------( 298      ( 2024 17:06 )             45.1     02-16    141  |  103  |  16  ----------------------------<  102<H>  3.9   |  24  |  0.85    Ca    10.0      2024 17:06    TPro  7.5  /  Alb  4.5  /  TBili  0.4  /  DBili  x   /  AST  15  /  ALT  20  /  AlkPhos  77  02-16

## 2024-02-16 NOTE — CONSULT NOTE ADULT - SUBJECTIVE AND OBJECTIVE BOX
Neurology - Consult Note    -  Spectra: [phone] ([facility]), [phone] ([facility])  -    HPI: Patient YARON NIÑO is a 48y (1975) woman with a PMHx significant for RA, left knee meniscal tear repair 01/26/24 p/w RLE loss of sensation x 2 weeks.     States sxs started after her L knee meniscus repair. Was also experiencing loss of sensation in her vagina and anus. Thought sxs would resolve so did not seek medical attention immediately. Went to see her orthopedist who sent her for PT, sxs did not improve.         Then saw neuro for sxs and was advised to get an outpt MRI.   left meniscus repair on 1/25/24 c/b post operative saddle anesthesia. Will get imaging as concerned for S2-4 radiculopathy.  - MRI L Spine  - If negative, will imagine neuroaxis to rule out central lesion.     Motor: muscle tone was normal in all four extremities, muscle strength was normal in all four extremities and normal bulk in all four extremities.    Light Touch: (Mildly decreased on sole and calf of right foot).    Pain/Temperature: (Mildly decreased on sole and calf of right foot).    Coordination:. normal gait.    Deep tendon reflexes:   Biceps right 2+. Biceps left 2+.    Triceps right 2+. Triceps left 2+.    Brachioradialis right 2+. Brachioradialis left 2+.    Patella right 2+. Patella left 2+.    Ankle jerk right 2+. Ankle jerk left 2+.         Called her Neurologist again today for worsening loss of sensation and was told to come to ED for urgent MRI. Denies extremity weakness, trauma or injury. No bladder or bowel dsfxn.       Review of Systems:  INCOMPLETE   CONSTITUTIONAL: No fevers or chills  EYES AND ENT: No visual changes or no throat pain   NECK: No pain or stiffness  RESPIRATORY: No hemoptysis or shortness of breath  CARDIOVASCULAR: No chest pain or palpitations  GASTROINTESTINAL: No melena or hematochezia  GENITOURINARY: No dysuria or hematuria  NEUROLOGICAL: +As stated in HPI above  SKIN: No itching, burning, rashes, or lesions   All other review of systems is negative unless indicated above.    Allergies:  sulfa drugs (Angioedema)  penicillin (Rash)  Ancef (Urticaria)  Bactrim (Swelling)      PMHx/PSHx/Family Hx: As above, otherwise see below   Psoriatic arthritis        Social Hx:  No current use of tobacco, alcohol, or illicit drugs  Lives with ***    Medications:  MEDICATIONS  (STANDING):    MEDICATIONS  (PRN):      Vitals:  T(C): 36.9 (02-16-24 @ 17:30), Max: 37.1 (02-16-24 @ 16:00)  HR: 82 (02-16-24 @ 17:30) (82 - 89)  BP: 152/98 (02-16-24 @ 17:30) (152/98 - 162/115)  RR: 18 (02-16-24 @ 17:30) (15 - 18)  SpO2: 98% (02-16-24 @ 17:30) (98% - 98%)    Physical Examination: INCOMPLETE  General - NAD  Cardiovascular - Peripheral pulses palpable, no edema  Eyes - Fundoscopy with flat, sharp optic discs and no hemorrhage or exudates; Fundoscopy not well visualized; Fundoscopy not performed due to safety precautions in the setting of the COVID-19 pandemic    Neurologic Exam:  Mental status - Awake, Alert, Oriented to person, place, and time. Speech fluent, repetition and naming intact. Follows simple and complex commands. Attention/concentration, recent and remote memory (including registration and recall), and fund of knowledge intact    Cranial nerves - PERRLA, VFF, EOMI, face sensation (V1-V3) intact b/l, facial strength intact without asymmetry b/l, hearing intact b/l, palate with symmetric elevation, trapezius OR sternocleidomastiod 5/5 strength b/l, tongue midline on protrusion with full lateral movement    Motor - Normal bulk and tone throughout. No pronator drift.  Strength testing            Deltoid      Biceps      Triceps     Wrist Extension    Wrist Flexion     Interossei         R            5                 5               5                     5                              5                        5                 5  L             5                 5               5                     5                              5                        5                 5              Hip Flexion    Hip Extension    Knee Flexion    Knee Extension    Dorsiflexion    Plantar Flexion  R              5                           5                       5                           5                            5                          5  L              5                           5                        5                           5                            5                          5    Sensation - Light touch/temperature OR pain/vibration intact throughout    DTR's -             Biceps      Triceps     Brachioradialis      Patellar    Ankle    Toes/plantar response  R             2+             2+                  2+                       2+            2+                 Down  L              2+             2+                 2+                        2+           2+                 Down    Coordination - Finger to Nose intact b/l. No tremors appreciated    Gait and station - Normal casual gait. Romberg (-)    Labs:                        14.7   7.17  )-----------( 298      ( 16 Feb 2024 17:06 )             45.1     02-16    141  |  103  |  16  ----------------------------<  102<H>  3.9   |  24  |  0.85    Ca    10.0      16 Feb 2024 17:06    TPro  7.5  /  Alb  4.5  /  TBili  0.4  /  DBili  x   /  AST  15  /  ALT  20  /  AlkPhos  77  02-16    CAPILLARY BLOOD GLUCOSE        LIVER FUNCTIONS - ( 16 Feb 2024 17:06 )  Alb: 4.5 g/dL / Pro: 7.5 g/dL / ALK PHOS: 77 U/L / ALT: 20 U/L / AST: 15 U/L / GGT: x             PT/INR - ( 16 Feb 2024 17:06 )   PT: 11.0 sec;   INR: 1.05 ratio         PTT - ( 16 Feb 2024 17:06 )  PTT:31.1 sec          Radiology:    MR Acute Spinal Cord Compression (02.16.24 @ 18:44) >  FINDINGS:    Studies are limited by motion artifact.    CERVICAL SPINE:    Straightening of the cervical lordosis. Vertebral body heights are within   normal limits. Marrow signal is preserved.    No cord signal abnormality.    Evaluation of individual levels demonstrates:    C2-C3: No significant spinal canal or neuroforaminal narrowing.    C3-C4: Disc osteophyte complex mildly indents the thecal sac. Mild right   neuroforaminal narrowing.    C4-C5: No significant spinal canal or neuroforaminal narrowing.    C5-C6: Disc osteophyte complex indents the ventral cord, without cord   signal abnormality. Mild left and severe right neuroforaminal narrowing.    C6-C7: No significant spinal canal or neuroforaminal narrowing.    C7-T1: No significant spinal canal or neuroforaminal narrowing.    THORACIC SPINE:    Thoracic alignment is maintained. Vertebral body heights are within   normal limits. A T1 hyperintense lesion in the inferior T7 vertebral body   suggesting a hemangioma.    A small T2 hyperintense lesion in the dorsal spinal cord at the T5 level   (21:21). Recommend MRI of thoracic spine with contrast for further   evaluation.    At T8-T9, there is a left paracentral disc protrusion which mildly   indents the ventral thecal sac. No significant spinal canal or   neuroforaminal narrowing along the thoracic spine. Multilevel   degenerative disc signal.    LUMBAR SPINE:    Lumbar alignment is maintained. Vertebral body heights are within normal   limits. T1 hyperintense lesions in the L1 and L3 vertebral body   suggesting hemangiomas.    No significant spinal canal or neuroforaminal narrowing along the lumbar   spine.    The conus is normal in position and morphology at the L1 level.      IMPRESSION:  Cervical spine: At C5-C6, a disc osteophyte complex indents the ventral   cord, without cord signal abnormality. Mild left and severe right   neuroforaminal narrowing.  Thoracic spine: No cord compression. A small T2 hyperintense lesion in   the dorsal spinal cord at the T5 level (21:21). Recommend MRI of thoracic   spine with contrast for further evaluation.  Lumbar spine: No cord compression. No cord signal abnormality.         Neurology - Consult Note    -  Spectra: [phone] ([facility]), [phone] ([facility])  -    HPI: Patient YARON NIÑO is a 48y (1975) woman with a PMHx significant for RA, left knee meniscal tear repair 01/26/24 p/w RLE loss of sensation x 2 weeks.     States sxs started after her L knee meniscus repair. Was also experiencing loss of sensation in her vagina and anus. Thought sxs would resolve so did not seek medical attention immediately. Went to see her orthopedist who sent her for PT, sxs did not improve.     Then saw neuro for sxs and was advised to get an outpt MRI.   left meniscus repair on 1/25/24 c/b post operative saddle anesthesia. Will get imaging as concerned for S2-4 radiculopathy.  - MRI L Spine  - If negative, will imagine neuroaxis to rule out central lesion.     Motor: muscle tone was normal in all four extremities, muscle strength was normal in all four extremities and normal bulk in all four extremities.    Light Touch: (Mildly decreased on sole and calf of right foot).    Pain/Temperature: (Mildly decreased on sole and calf of right foot).    Coordination:. normal gait.    Deep tendon reflexes:   Biceps right 2+. Biceps left 2+.    Triceps right 2+. Triceps left 2+.    Brachioradialis right 2+. Brachioradialis left 2+.    Patella right 2+. Patella left 2+.    Ankle jerk right 2+. Ankle jerk left 2+.         Called her Neurologist again today for worsening loss of sensation and was told to come to ED for urgent MRI. Denies extremity weakness, trauma or injury. No bladder or bowel dsfxn.       Review of Systems:  INCOMPLETE   CONSTITUTIONAL: No fevers or chills  EYES AND ENT: No visual changes or no throat pain   NECK: No pain or stiffness  RESPIRATORY: No hemoptysis or shortness of breath  CARDIOVASCULAR: No chest pain or palpitations  GASTROINTESTINAL: No melena or hematochezia  GENITOURINARY: No dysuria or hematuria  NEUROLOGICAL: +As stated in HPI above  SKIN: No itching, burning, rashes, or lesions   All other review of systems is negative unless indicated above.    Allergies:  sulfa drugs (Angioedema)  penicillin (Rash)  Ancef (Urticaria)  Bactrim (Swelling)      PMHx/PSHx/Family Hx: As above, otherwise see below   Psoriatic arthritis        Social Hx:  No current use of tobacco, alcohol, or illicit drugs  Lives with ***    Medications:  MEDICATIONS  (STANDING):    MEDICATIONS  (PRN):      Vitals:  T(C): 36.9 (02-16-24 @ 17:30), Max: 37.1 (02-16-24 @ 16:00)  HR: 82 (02-16-24 @ 17:30) (82 - 89)  BP: 152/98 (02-16-24 @ 17:30) (152/98 - 162/115)  RR: 18 (02-16-24 @ 17:30) (15 - 18)  SpO2: 98% (02-16-24 @ 17:30) (98% - 98%)    Physical Examination: INCOMPLETE  General - NAD  Cardiovascular - Peripheral pulses palpable, no edema  Eyes - Fundoscopy with flat, sharp optic discs and no hemorrhage or exudates; Fundoscopy not well visualized; Fundoscopy not performed due to safety precautions in the setting of the COVID-19 pandemic    Neurologic Exam:  Mental status - Awake, Alert, Oriented to person, place, and time. Speech fluent, repetition and naming intact. Follows simple and complex commands. Attention/concentration, recent and remote memory (including registration and recall), and fund of knowledge intact    Cranial nerves - PERRLA, VFF, EOMI, face sensation (V1-V3) intact b/l, facial strength intact without asymmetry b/l, hearing intact b/l, palate with symmetric elevation, trapezius OR sternocleidomastiod 5/5 strength b/l, tongue midline on protrusion with full lateral movement    Motor - Normal bulk and tone throughout. No pronator drift.  Strength testing            Deltoid      Biceps      Triceps     Wrist Extension    Wrist Flexion     Interossei         R            5                 5               5                     5                              5                        5                 5  L             5                 5               5                     5                              5                        5                 5              Hip Flexion    Hip Extension    Knee Flexion    Knee Extension    Dorsiflexion    Plantar Flexion  R              5                           5                       5                           5                            5                          5  L              5                           5                        5                           5                            5                          5    Sensation - Light touch/temperature OR pain/vibration intact throughout    DTR's -             Biceps      Triceps     Brachioradialis      Patellar    Ankle    Toes/plantar response  R             2+             2+                  2+                       2+            2+                 Down  L              2+             2+                 2+                        2+           2+                 Down    Coordination - Finger to Nose intact b/l. No tremors appreciated    Gait and station - Normal casual gait. Romberg (-)    Labs:                        14.7   7.17  )-----------( 298      ( 16 Feb 2024 17:06 )             45.1     02-16    141  |  103  |  16  ----------------------------<  102<H>  3.9   |  24  |  0.85    Ca    10.0      16 Feb 2024 17:06    TPro  7.5  /  Alb  4.5  /  TBili  0.4  /  DBili  x   /  AST  15  /  ALT  20  /  AlkPhos  77  02-16    CAPILLARY BLOOD GLUCOSE        LIVER FUNCTIONS - ( 16 Feb 2024 17:06 )  Alb: 4.5 g/dL / Pro: 7.5 g/dL / ALK PHOS: 77 U/L / ALT: 20 U/L / AST: 15 U/L / GGT: x             PT/INR - ( 16 Feb 2024 17:06 )   PT: 11.0 sec;   INR: 1.05 ratio         PTT - ( 16 Feb 2024 17:06 )  PTT:31.1 sec          Radiology:    MR Acute Spinal Cord Compression (02.16.24 @ 18:44) >  FINDINGS:    Studies are limited by motion artifact.    CERVICAL SPINE:    Straightening of the cervical lordosis. Vertebral body heights are within   normal limits. Marrow signal is preserved.    No cord signal abnormality.    Evaluation of individual levels demonstrates:    C2-C3: No significant spinal canal or neuroforaminal narrowing.    C3-C4: Disc osteophyte complex mildly indents the thecal sac. Mild right   neuroforaminal narrowing.    C4-C5: No significant spinal canal or neuroforaminal narrowing.    C5-C6: Disc osteophyte complex indents the ventral cord, without cord   signal abnormality. Mild left and severe right neuroforaminal narrowing.    C6-C7: No significant spinal canal or neuroforaminal narrowing.    C7-T1: No significant spinal canal or neuroforaminal narrowing.    THORACIC SPINE:    Thoracic alignment is maintained. Vertebral body heights are within   normal limits. A T1 hyperintense lesion in the inferior T7 vertebral body   suggesting a hemangioma.    A small T2 hyperintense lesion in the dorsal spinal cord at the T5 level   (21:21). Recommend MRI of thoracic spine with contrast for further   evaluation.    At T8-T9, there is a left paracentral disc protrusion which mildly   indents the ventral thecal sac. No significant spinal canal or   neuroforaminal narrowing along the thoracic spine. Multilevel   degenerative disc signal.    LUMBAR SPINE:    Lumbar alignment is maintained. Vertebral body heights are within normal   limits. T1 hyperintense lesions in the L1 and L3 vertebral body   suggesting hemangiomas.    No significant spinal canal or neuroforaminal narrowing along the lumbar   spine.    The conus is normal in position and morphology at the L1 level.      IMPRESSION:  Cervical spine: At C5-C6, a disc osteophyte complex indents the ventral   cord, without cord signal abnormality. Mild left and severe right   neuroforaminal narrowing.  Thoracic spine: No cord compression. A small T2 hyperintense lesion in   the dorsal spinal cord at the T5 level (21:21). Recommend MRI of thoracic   spine with contrast for further evaluation.  Lumbar spine: No cord compression. No cord signal abnormality.

## 2024-02-16 NOTE — ED PROVIDER NOTE - ATTENDING APP SHARED VISIT CONTRIBUTION OF CARE
48 F w/ psoriatic arthritis on taltz, hx of meniscus repair on 1/24, presents to the ER w/ R foot numbness, pt states that after sx on the L miniscus she noted the sole of the foot w/ numbness, pt w/ no fevers, no chills, no nausea no vomiting, no lightheadedness.   On exam Const: no acute distress, Well-developed, Eyes: no conjunctival injection and no scleral icterus ENMT: Moist mucus membranes, CVS: +S1/S2, radial pulse 2+ bilaterally  RESP: Unlabored respiratory effort, Clear to auscultation bilaterally GI: Nontender/Nondistended soft abdomen, no CVA tenderness BACK no C/T/L spine tenderness, MSK: Extremities w/o deformity or ttp, R foot w/ posterior foot, Psych: Awake, Alert, & Orientedx3;  Appropriate mood and affect, cooperative  PLan for labs imaging eval for acute cord compression, pt w/ numbness along the posterior aspect of the lower leg on the R side,

## 2024-02-16 NOTE — ED PROVIDER NOTE - CLINICAL SUMMARY MEDICAL DECISION MAKING FREE TEXT BOX
47 yo F with a PMH of RA, left knee meniscal tear repair 01/26/24 p/w LLE loss of sensation x 2 weeks. States sxs started after her L knee meniscus repair. Was also experiencing loss of sensation in her vagina and anus. Thought sxs would resolve so did not seek medical attention immediately. Went to see her orthopedist who sent her for PT, sxs did not improve. Then saw neuro for sxs and was advised to get an outpt MRI. Called her Neurologist again today for worsening loss of sensation and was told to come to ED for urgent MRI. Denies extremity weakness, trauma or injury. No bladder or bowel dsfxn. Plan for MRI acute cord compression protocol and neurosx spine cs. Greg Corea PA-C 47 yo F with a PMH of RA, left knee meniscal tear repair 01/26/24 p/w RLE loss of sensation x 2 weeks. States sxs started after her L knee meniscus repair. Was also experiencing loss of sensation in her vagina and anus. Thought sxs would resolve so did not seek medical attention immediately. Went to see her orthopedist who sent her for PT, sxs did not improve. Then saw neuro for sxs and was advised to get an outpt MRI. Called her Neurologist again today for worsening loss of sensation and was told to come to ED for urgent MRI. Denies extremity weakness, trauma or injury. No bladder or bowel dsfxn. Plan for MRI acute cord compression protocol and neurosx spine cs. Greg Corea PA-C

## 2024-02-16 NOTE — CONSULT NOTE ADULT - ASSESSMENT
Impression:        Recommendations:              Case to be discussed with Neurology attending Dr. Ambrocio         Impression:        Plan:    [] Admit to General Neurology floor under Dr. Ambrocio  [] MRI brain w and w/o contrast  [] MRI C AND T spine (down to conus) w and w/o contrast  [] Obtain CXR  [] Bloodwork: NMO ab, MOG ab, dsDNA, JANET, ANCA, ESR, CRP, ACE, Lyme, WNV, paraneoplastic panel, vitamin b12, folate, vitamin D 25 OH  [] Will consider LP, steroids pending imaging results  [ ] PT/OT  [ ] Fall precautions      Case discussed with Neurology attending Dr. Ambrocio

## 2024-02-16 NOTE — CONSULT NOTE ADULT - ASSESSMENT
Patricio Mary  48F Hx L knee meniscus repair 1/26/24 p/f 2w R foot numbness. MR cord compression w/moderate stensosis C5/6 from disc, T2 signal @T6 in posterior column, L spine w/o stenosis.  Attests to loss of sensation to groin/genitals, rectal tone intact per ED. Denied radicular pain, clumsy hands, abnormal gait.   Exam: HAND 5/5, decreased sensation along R sural n, normoreflexive, proprioception intact   -PVR, if unable to void bladder scan, cohen if retaining  -Neurology consult   -No acute neurosurgery intervention, can follow up with Dr. Conchis DUBOIS if she develops UE symptoms or myelopathy attributable to C5/6 disc

## 2024-02-16 NOTE — ED PROVIDER NOTE - PHYSICAL EXAMINATION
CONSTITUTIONAL: Well appearing and in no apparent distress.  ENT: Airway patent, moist mucous membranes.   EYES: Pupils equal, round and reactive to light. EOMI. Conjunctiva normal appearing.   CARDIAC: Normal rate, regular rhythm.  Heart sounds S1, S2.    RESPIRATORY: Breath sounds clear and equal bilaterally.   GASTROINTESTINAL: Abdomen soft, non-tender, not distended.  RECTAL: Normal rectal tone.  MUSCULOSKELETAL: Spine appears normal. No midline TTP. Moving all extremities. Gait steady.   NEUROLOGICAL: Alert and oriented x3, no focal deficits, no motor deficits. 5/5 muscle strength throughout. No saddle anesthesia.   Decreased sensation to posterior LLE, worse from L ankle to L foot. DP pulses palpable.   SKIN: Skin normal color, warm, dry and intact.   PSYCHIATRIC: Normal mood and affect. CONSTITUTIONAL: Well appearing and in no apparent distress.  ENT: Airway patent, moist mucous membranes.   EYES: Pupils equal, round and reactive to light. EOMI. Conjunctiva normal appearing.   CARDIAC: Normal rate, regular rhythm.  Heart sounds S1, S2.    RESPIRATORY: Breath sounds clear and equal bilaterally.   GASTROINTESTINAL: Abdomen soft, non-tender, not distended.  RECTAL: Normal rectal tone.  MUSCULOSKELETAL: Spine appears normal. No midline TTP. Moving all extremities. Gait steady.   NEUROLOGICAL: Alert and oriented x3, no focal deficits, no motor deficits. 5/5 muscle strength throughout. No saddle anesthesia.   Decreased sensation to posterior RLE, worse from R ankle to R foot. DP pulses palpable.   SKIN: Skin normal color, warm, dry and intact.   PSYCHIATRIC: Normal mood and affect. CONSTITUTIONAL: Well appearing and in no apparent distress.  ENT: Airway patent, moist mucous membranes.   EYES: Pupils equal, round and reactive to light. EOMI. Conjunctiva normal appearing.   CARDIAC: Normal rate, regular rhythm.  Heart sounds S1, S2.    RESPIRATORY: Breath sounds clear and equal bilaterally.   GASTROINTESTINAL: Abdomen soft, non-tender, not distended.  RECTAL: Normal rectal tone.  MUSCULOSKELETAL: Spine appears normal. No midline TTP. Moving all extremities. Gait steady.   NEUROLOGICAL: Alert and oriented x3, no motor deficits. 5/5 muscle strength throughout. No saddle anesthesia. rectal tone intact chaperone panosian  Decreased sensation to posterior RLE, worse from R ankle to R foot. DP pulses palpable.   SKIN: Skin normal color, warm, dry and intact.   PSYCHIATRIC: Normal mood and affect.

## 2024-02-17 ENCOUNTER — TRANSCRIPTION ENCOUNTER (OUTPATIENT)
Age: 49
End: 2024-02-17

## 2024-02-17 VITALS
HEART RATE: 88 BPM | OXYGEN SATURATION: 97 % | TEMPERATURE: 98 F | RESPIRATION RATE: 18 BRPM | SYSTOLIC BLOOD PRESSURE: 108 MMHG | DIASTOLIC BLOOD PRESSURE: 64 MMHG

## 2024-02-17 LAB
B BURGDOR C6 AB SER-ACNC: NEGATIVE — SIGNIFICANT CHANGE UP
B BURGDOR IGG+IGM SER-ACNC: 0.06 INDEX — SIGNIFICANT CHANGE UP (ref 0.01–0.89)
CRP SERPL-MCNC: <3 MG/L — SIGNIFICANT CHANGE UP (ref 0–4)
ERYTHROCYTE [SEDIMENTATION RATE] IN BLOOD: 24 MM/HR — HIGH (ref 0–15)

## 2024-02-17 PROCEDURE — 82746 ASSAY OF FOLIC ACID SERUM: CPT

## 2024-02-17 PROCEDURE — 86901 BLOOD TYPING SEROLOGIC RH(D): CPT

## 2024-02-17 PROCEDURE — 86900 BLOOD TYPING SEROLOGIC ABO: CPT

## 2024-02-17 PROCEDURE — 86225 DNA ANTIBODY NATIVE: CPT

## 2024-02-17 PROCEDURE — 72157 MRI CHEST SPINE W/O & W/DYE: CPT | Mod: 26

## 2024-02-17 PROCEDURE — 86618 LYME DISEASE ANTIBODY: CPT

## 2024-02-17 PROCEDURE — 85025 COMPLETE CBC W/AUTO DIFF WBC: CPT

## 2024-02-17 PROCEDURE — 36415 COLL VENOUS BLD VENIPUNCTURE: CPT

## 2024-02-17 PROCEDURE — 83690 ASSAY OF LIPASE: CPT

## 2024-02-17 PROCEDURE — 82164 ANGIOTENSIN I ENZYME TEST: CPT

## 2024-02-17 PROCEDURE — 80053 COMPREHEN METABOLIC PANEL: CPT

## 2024-02-17 PROCEDURE — 71045 X-RAY EXAM CHEST 1 VIEW: CPT | Mod: 26

## 2024-02-17 PROCEDURE — 82803 BLOOD GASES ANY COMBINATION: CPT

## 2024-02-17 PROCEDURE — 86140 C-REACTIVE PROTEIN: CPT

## 2024-02-17 PROCEDURE — 86362 MOG-IGG1 ANTB CBA EACH: CPT

## 2024-02-17 PROCEDURE — 82435 ASSAY OF BLOOD CHLORIDE: CPT

## 2024-02-17 PROCEDURE — 86038 ANTINUCLEAR ANTIBODIES: CPT

## 2024-02-17 PROCEDURE — 86850 RBC ANTIBODY SCREEN: CPT

## 2024-02-17 PROCEDURE — 83605 ASSAY OF LACTIC ACID: CPT

## 2024-02-17 PROCEDURE — 76498 UNLISTED MR PROCEDURE: CPT

## 2024-02-17 PROCEDURE — 82607 VITAMIN B-12: CPT

## 2024-02-17 PROCEDURE — 84132 ASSAY OF SERUM POTASSIUM: CPT

## 2024-02-17 PROCEDURE — 82330 ASSAY OF CALCIUM: CPT

## 2024-02-17 PROCEDURE — 70553 MRI BRAIN STEM W/O & W/DYE: CPT | Mod: 26

## 2024-02-17 PROCEDURE — 82947 ASSAY GLUCOSE BLOOD QUANT: CPT

## 2024-02-17 PROCEDURE — 85014 HEMATOCRIT: CPT

## 2024-02-17 PROCEDURE — 86053 AQAPRN-4 ANTB FLO CYTMTRY EA: CPT

## 2024-02-17 PROCEDURE — 85610 PROTHROMBIN TIME: CPT

## 2024-02-17 PROCEDURE — 99222 1ST HOSP IP/OBS MODERATE 55: CPT

## 2024-02-17 PROCEDURE — 84295 ASSAY OF SERUM SODIUM: CPT

## 2024-02-17 PROCEDURE — 86036 ANCA SCREEN EACH ANTIBODY: CPT

## 2024-02-17 PROCEDURE — 71045 X-RAY EXAM CHEST 1 VIEW: CPT

## 2024-02-17 PROCEDURE — 72142 MRI NECK SPINE W/DYE: CPT

## 2024-02-17 PROCEDURE — 85652 RBC SED RATE AUTOMATED: CPT

## 2024-02-17 PROCEDURE — 72142 MRI NECK SPINE W/DYE: CPT | Mod: 26

## 2024-02-17 PROCEDURE — 85730 THROMBOPLASTIN TIME PARTIAL: CPT

## 2024-02-17 PROCEDURE — 99285 EMERGENCY DEPT VISIT HI MDM: CPT | Mod: 25

## 2024-02-17 PROCEDURE — 82306 VITAMIN D 25 HYDROXY: CPT

## 2024-02-17 PROCEDURE — 70553 MRI BRAIN STEM W/O & W/DYE: CPT

## 2024-02-17 PROCEDURE — 72157 MRI CHEST SPINE W/O & W/DYE: CPT

## 2024-02-17 PROCEDURE — 85018 HEMOGLOBIN: CPT

## 2024-02-17 PROCEDURE — G0378: CPT

## 2024-02-17 PROCEDURE — A9585: CPT

## 2024-02-17 RX ORDER — ASPIRIN/CALCIUM CARB/MAGNESIUM 324 MG
162 TABLET ORAL
Refills: 0 | Status: DISCONTINUED | OUTPATIENT
Start: 2024-02-17 | End: 2024-02-17

## 2024-02-17 RX ORDER — LANOLIN ALCOHOL/MO/W.PET/CERES
3 CREAM (GRAM) TOPICAL ONCE
Refills: 0 | Status: COMPLETED | OUTPATIENT
Start: 2024-02-17 | End: 2024-02-17

## 2024-02-17 RX ORDER — LIDOCAINE HCL 20 MG/ML
10 VIAL (ML) INJECTION ONCE
Refills: 0 | Status: DISCONTINUED | OUTPATIENT
Start: 2024-02-17 | End: 2024-02-17

## 2024-02-17 RX ORDER — GABAPENTIN 400 MG/1
1 CAPSULE ORAL
Qty: 90 | Refills: 2
Start: 2024-02-17 | End: 2024-05-16

## 2024-02-17 RX ORDER — IXEKIZUMAB 80 MG/ML
80 INJECTION, SOLUTION SUBCUTANEOUS
Refills: 0 | DISCHARGE

## 2024-02-17 RX ORDER — ENOXAPARIN SODIUM 100 MG/ML
40 INJECTION SUBCUTANEOUS EVERY 24 HOURS
Refills: 0 | Status: DISCONTINUED | OUTPATIENT
Start: 2024-02-17 | End: 2024-02-17

## 2024-02-17 RX ADMIN — Medication 3 MILLIGRAM(S): at 01:01

## 2024-02-17 RX ADMIN — Medication 162 MILLIGRAM(S): at 11:25

## 2024-02-17 NOTE — DISCHARGE NOTE PROVIDER - CARE PROVIDER_API CALL
Silvano Choe  Neurology  [address], [facility] [address]  [place] [address]  Phone: [phone]  Fax: [phone]  Established Patient  Follow Up Time: 2 weeks

## 2024-02-17 NOTE — H&P ADULT - NSHPLABSRESULTS_GEN_ALL_CORE
14.7   7.17  )-----------( 298      ( 16 Feb 2024 17:06 )             45.1   02-16    141  |  103  |  16  ----------------------------<  102<H>  3.9   |  24  |  0.85    Ca    10.0      16 Feb 2024 17:06    TPro  7.5  /  Alb  4.5  /  TBili  0.4  /  DBili  x   /  AST  15  /  ALT  20  /  AlkPhos  77  02-16    PT/INR - ( 16 Feb 2024 17:06 )   PT: 11.0 sec;   INR: 1.05 ratio         PTT - ( 16 Feb 2024 17:06 )  PTT:31.1 sec  Urinalysis Basic - ( 16 Feb 2024 17:06 )    Color: x / Appearance: x / SG: x / pH: x  Gluc: 102 mg/dL / Ketone: x  / Bili: x / Urobili: x   Blood: x / Protein: x / Nitrite: x   Leuk Esterase: x / RBC: x / WBC x   Sq Epi: x / Non Sq Epi: x / Bacteria: x    RADIOLOGY, EKG & ADDITIONAL TESTS: Reviewed.     MR Acute Spinal Cord Compression (02.16.24 @ 18:44) >  FINDINGS:    Studies are limited by motion artifact.    CERVICAL SPINE:    Straightening of the cervical lordosis. Vertebral body heights are within   normal limits. Marrow signal is preserved.    No cord signal abnormality.    Evaluation of individual levels demonstrates:    C2-C3: No significant spinal canal or neuroforaminal narrowing.    C3-C4: Disc osteophyte complex mildly indents the thecal sac. Mild right   neuroforaminal narrowing.    C4-C5: No significant spinal canal or neuroforaminal narrowing.    C5-C6: Disc osteophyte complex indents the ventral cord, without cord   signal abnormality. Mild left and severe right neuroforaminal narrowing.    C6-C7: No significant spinal canal or neuroforaminal narrowing.    C7-T1: No significant spinal canal or neuroforaminal narrowing.    THORACIC SPINE:    Thoracic alignment is maintained. Vertebral body heights are within   normal limits. A T1 hyperintense lesion in the inferior T7 vertebral body   suggesting a hemangioma.    A small T2 hyperintense lesion in the dorsal spinal cord at the T5 level   (21:21). Recommend MRI of thoracic spine with contrast for further   evaluation.    At T8-T9, there is a left paracentral disc protrusion which mildly   indents the ventral thecal sac. No significant spinal canal or   neuroforaminal narrowing along the thoracic spine. Multilevel   degenerative disc signal.    LUMBAR SPINE:    Lumbar alignment is maintained. Vertebral body heights are within normal   limits. T1 hyperintense lesions in the L1 and L3 vertebral body   suggesting hemangiomas.    No significant spinal canal or neuroforaminal narrowing along the lumbar   spine.    The conus is normal in position and morphology at the L1 level.      IMPRESSION:  Cervical spine: At C5-C6, a disc osteophyte complex indents the ventral   cord, without cord signal abnormality. Mild left and severe right   neuroforaminal narrowing.  Thoracic spine: No cord compression. A small T2 hyperintense lesion in   the dorsal spinal cord at the T5 level (21:21). Recommend MRI of thoracic   spine with contrast for further evaluation.  Lumbar spine: No cord compression. No cord signal abnormality.

## 2024-02-17 NOTE — PATIENT PROFILE ADULT - FALL HARM RISK - UNIVERSAL INTERVENTIONS
Bed in lowest position, wheels locked, appropriate side rails in place/Call bell, personal items and telephone in reach/Instruct patient to call for assistance before getting out of bed or chair/Non-slip footwear when patient is out of bed/[place] to call system/Physically safe environment - no spills, clutter or unnecessary equipment/Purposeful Proactive Rounding/Room/bathroom lighting operational, light cord in reach

## 2024-02-17 NOTE — DISCHARGE NOTE PROVIDER - NSDCCPCAREPLAN_GEN_ALL_CORE_FT
PRINCIPAL DISCHARGE DIAGNOSIS  Diagnosis: Right leg numbness  Assessment and Plan of Treatment: You came to the hospital due to multiple months of lower extrmeity numbness that radiates to your pelvic area. You have seen Dr. Choe, Neurologist in the outpatient setting who recommended that you come to the ED for an MRI. You were admitted to our neurology primary service for workup. You received a spinal compression MRI which DID NOT demonstrate cord compression but demonstrated a signal in your thoracic spine area. We continued the workup by repeating an MRI of your brain and cervical and thoracic spine (to conus). MRI revealed white matter hyperintesnsities in your brain and repeat signal in your thoracic spine, which may indicate demyelinating disease such as multiple sclerosis. You declined a lumbar puncture in the hospital, opting for further diagnosis and treatment after discharge. You requested a second opinion. We tian serum labs for which you will follow up in the outpatient setting. You mentioned that Dr. Choe recommended gabapentin for your neuropathic pain but you declined it at that time. You were given a prescription of gabapentin upon discharge that you will  at your pharmacy.   WE RECOMMEND FOLLOWING UP WITH YOUR NEUROLOGIST DR. KASSANDRA CHOE FOR FURTHER MANAGEMENT GIVEN YOUR IMAGING FINDINGS.

## 2024-02-17 NOTE — PHYSICAL THERAPY INITIAL EVALUATION ADULT - PERTINENT HX OF CURRENT PROBLEM, REHAB EVAL
PMHx: psoriatic arthritis (monthly Taltz injection), left knee meniscal tear repair 01/26/24. presents with loss of sensation in RLE and groin x2wks. Pt states she noticed her symptoms started 2-3 days after her L knee meniscus repair. She noticed numbness in the sole of her right foot, back of her leg into button region. She is also experiencing loss of sensation in her vagina and anus. Describes pins and needles sensation in her right foot and heel. Endorses some back pain immediately after procedure but denies any back pain or leg pain now. She denies any weakness, falls, visual symptoms, bowel/bladder issues, no illnesses or sick contacts. Pt went to outpatient Neurologist who was concerned for S2-4 radiculopathy and recommended MRI L spine. symptoms persisted so she was told to come to the ED to rule out cord compression. Rectal tone reportedly normal per ED. MRI cord compression series was obtained and showed a small T2 hyperintense lesion in the dorsal spinal cord at the T5 level.     MRI acute spine: Cervical spine: At C5-C6, a disc osteophyte complex indents the ventral cord, without cord signal abnormality. Mild L and severe R neuroforaminal narrowing. Thoracic spine: No cord compression. A small T2 hyperintense lesion in the dorsal spinal cord at the T5 level (21:21). Recommend MRI of thoracic spine with contrast for further evaluation. Lumbar spine: No cord compression. No cord signal abnormality.

## 2024-02-17 NOTE — H&P ADULT - ASSESSMENT
Patient YARON NIÑO is a 48y (1975) woman with a PMHx significant for psoriatic arthritis monthly Taltz injection), left knee meniscal tear repair 01/26/24 who presents with loss of sensation in RLE and groin x 2 weeks.     Impression:  Two weeks of RLE posterior/ lateral numbness, paresthesias and saddle anesthesia  On imaging found to have dorsal T5 hyperintensity. Will need to evaluate for potential demyelinating/ inflammatory etiology of patient's symptoms      Plan:  [ ] Admit to General Neurology floor under Dr. Ambrocio  [ ] MRI brain w and w/o contrast  [ ] MRI C and T spine (down to conus) w and w/o contrast  [ ] Obtain CXR  [ ] Send serology: NMO ab, MOG ab, dsDNA, JANET, ANCA, ESR, CRP, ACE, Lyme, WNV, paraneoplastic panel, vitamin b12, folate, vitamin D 25 OH  [ ] Will consider LP and steroids pending imaging results  [ ] PT/OT  [ ] Fall precautions  [ ] Hold DVT ppx for now- in preparation for potential LP       Case discussed with Neurology attending Dr. Ambrocio. To be seen on AM rounds, note finalized upon attending attestation.

## 2024-02-17 NOTE — DISCHARGE NOTE PROVIDER - NSDCMRMEDTOKEN_GEN_ALL_CORE_FT
gabapentin 300 mg oral capsule: 1 cap(s) orally 3 times a day Start by taking 1 tablet for a day. Then take 1 tablet every 12 hours.for a day. Finally take 1 tablet every 8 hours for 30 days MDD: 900mg  Taltz Autoinjector 80 mg/mL subcutaneous solution: 80 milligram(s) subcutaneously once a month Last injection 3 weeks ago

## 2024-02-17 NOTE — DISCHARGE NOTE PROVIDER - HOSPITAL COURSE
YARON NIÑO is a 48y woman with a PMHx significant for psoriatic arthritis monthly Taltz injection), left knee meniscal tear repair 01/26/24 who presents with loss of sensation in RLE and groin. She first saw Dr. Silvano Choe in 11/20/2023. She said that two month prior she had an episode of swelling of the foot and calf. At that time she also had neuropathic pain and numbness in that foot, but by the next day the numbness resolved. 2 weeks later the swelling resolved on its own as well. She has seen cardiologist, vascular surgeon, rheumatologist, and hematologist; workup negative. Aside from when the right leg was swollen, she has had no other instances of numbness, tingling, or weakness in the feet.    She saw Dr. Choe again as a follow up in 02/15/2024 and states she noticed worsening of her symptoms started 2-3 days after her L knee meniscus repair (1/25/24). She noticed numbness in the sole of her right foot, back of her leg into button region. She is also experiencing loss of sensation in her vagina and anus. Describes pins and needles sensation in her right foot and heel. Endorsed some back pain immediately after procedure but denies any back pain or leg pain when she went to her follow-up. She denies any weakness, falls, visual symptoms, bowel/bladder issues. She has never had symptoms like this previously. Denies any recent illnesses or sick contacts. Neurologist Dr. Choe who was concerned for S2-4 radiculopathy and recommended MRI L spine. Her symptoms persisted so she was told to come to the ED to rule out cord compression. Rectal tone reportedly normal per ED.     HOSPITAL COURSE  She received a MRI of her spine for concerns of acute spine compression. The results were normal in the cervical spine. The thoracic spine sequence revealed a small T2 hyperintense lesion. You received comprehensive MRI of your head, cervical, and thoracic spine which revealed the results as below. The patient was offered a lumbar puncture for further workup of demyelinating disease but, upon speaking with family, is opting for a second opinion. Risk and benefits of the lumbar puncture and potential diagnosis was given and patient voiced understanding. Patient discharge was discussed with Dr. Ambrocio, Neurology attending who agreed. Upon discharge, patient was provided a prescription of gabapentin for neuropathic pain.   At the time of discharge, patient is pending serum ACE, JANET, Lyme, dsDNA, Folate, MOG, NMO, ANCA, paraneoplastic, Vitamin B12, D, [place] Nile results.     IMPRESSION   Right leg numbness and saddle anesthesia, with imaging findings, less likely cord compression versus potential demyelinating disease such as multiple sclerosis     IMAGING   MR Head Cervical and Thoracic Spine w/ IV Cont (02.17.24 @ 14:20)   1. BRAIN:   Several cerebral hemispheric white matter lesions include T2   bright nonenhancing lesions in the subcortical white matter, T2   intermediate signal nonenhancing lesions in the pericallosal white matter   and solitary enhancing corpus callosal lesion  2. CERVICAL SPINE:   No abnormal enhancement in the cervical spine. See   recent MR spine for additional findings  3. THORACIC SPINE:   Focal lesion within the thoracic cord parenchyma at   the T5 inferior endplate level localized to the right dorsal column   enhances with gadolinium administration. An active lesion is suspected.     In the context of the cerebral hemispheric white matter lesions, multiple   sclerosis should be considered. Mild-to-moderate thoracic degenerative   disc disease includes T7-T8 right central disc protrusion (disc   herniation)  causes ventral cord deformity    Xray Chest 1 View- PORTABLE-Routine (Xray Chest 1 View- PORTABLE-Routine .) (02.17.24 @ 09:10)   IMPRESSION: No active disease    MR Acute Spinal Cord Compression (02.16.24 @ 18:44)   Cervical spine: At C5-C6, a disc osteophyte complex indents the ventral   cord, without cord signal abnormality. Mild left and severe right   neuroforaminal narrowing.  Thoracic spine: No cord compression. A small T2 hyperintense lesion in   the dorsal spinal cord at the T5 level (21:21). Recommend MRI of thoracic   spine with contrast for further evaluation.  Lumbar spine: No cord compression. No cord signal abnormality.

## 2024-02-17 NOTE — DISCHARGE NOTE NURSING/CASE MANAGEMENT/SOCIAL WORK - PATIENT PORTAL LINK FT
You can access the FollowMyHealth Patient Portal offered by [facility] by registering at the following website: [email or web address]. By joining [facility]’s FollowMyHealth portal, you will also be able to view your health information using other applications (apps) compatible with our system.

## 2024-02-17 NOTE — H&P ADULT - ATTENDING COMMENTS
49 yo with recent lower extremity swelling, numbness and pain.  Following recent left knee procedure, she noted left heel and perineal numbness.  She was admitted and underwent MR imaging of spine which revealed thoracic cord lesion.    Exam is notable for brisk reflexes with neutral plantar responses.  Awaiting MRI brain, cervical and thoracis cpine C+/C-.

## 2024-02-17 NOTE — H&P ADULT - NSHPPHYSICALEXAM_GEN_ALL_CORE
Physical Examination:   General - NAD  Cardiovascular - Peripheral pulses palpable, no edema  Eyes -  Fundoscopy not performed due to safety precautions in the setting of the COVID-19 pandemic    Neurologic Exam:  Mental status - Awake, Alert, Oriented to person, place, and time. Speech fluent, repetition and naming intact. Follows simple and complex commands. Attention/concentration, recent and remote memory (including registration and recall), and fund of knowledge intact    Cranial nerves - PERRLA, VFF, EOMI, face sensation (V1-V3) intact b/l, facial strength intact without asymmetry b/l, hearing intact b/l, palate with symmetric elevation, trapezius  5/5 strength b/l, tongue midline on protrusion with full lateral movement    Motor - Normal bulk and tone throughout. No pronator drift.  Strength testing            Deltoid      Biceps      Triceps     Wrist Extension    Wrist Flexion     Interossei         R            5                 5               5                     5                              5                        5                 5  L             5                 5               5                     5                              5                        5                 5              Hip Flexion    Hip Extension    Knee Flexion    Knee Extension    Dorsiflexion    Plantar Flexion  R              5                           5                       5                           5                            5                          5  L              5                           5                        5                           5                            5                          5    Sensation - Light touch, temperature, pinprick decreased on R sole, heel, and posterior lateral aspect of right calf, thigh, gluteal region    No sensory level identified    DTR's -             Biceps      Triceps     Brachioradialis      Patellar    Ankle    Toes/plantar response  R             2+             2+                  2+                       2+            2+                 Down  L              2+             2+                 2+                        2+           2+                 Down    reflexes brisk, no ankle clonus noted    Coordination - Finger to Nose intact b/l. No tremors appreciated    Gait and station - Not assessed

## 2024-02-17 NOTE — H&P ADULT - HISTORY OF PRESENT ILLNESS
HPI: Patient YARON NIÑO is a 48y (1975) woman with a PMHx significant for psoriatic arthritis monthly Taltz injection), left knee meniscal tear repair 01/26/24 who presents with loss of sensation in RLE and groin x 2 weeks.     Patient states she noticed her symptoms started 2-3 days after her L knee meniscus repair. She noticed numbness in the sole of her right foot, back of her leg into button region. She is also experiencing loss of sensation in her vagina and anus. Describes pins and needles sensation in her right foot and heel. Endorses some back pain immediately after procedure but denies any back pain or leg pain now. She denies any weakness, falls, visual symptoms, bowel/bladder issues. She has never had symptoms like this previously. Denies any recent illnesses or sick contacts. Patient went to her outpatient Neurologist Dr. Choe who was concerned for S2-4 radiculopathy and recommended MRI L spine. Her symptoms persisted so she was told to come to the ED to rule out cord compression. Rectal tone reportedly normal per ED. MRI cord compression series was obtained and showed a small T2 hyperintense lesion in the dorsal spinal cord at the T5 level.

## 2024-02-17 NOTE — DISCHARGE NOTE PROVIDER - NSDCFUSCHEDAPPT_GEN_ALL_CORE_FT
[facility]  MRI  [facility]  Scheduled Appointment: 02/20/2024

## 2024-02-18 LAB
24R-OH-CALCIDIOL SERPL-MCNC: 21.1 NG/ML — LOW (ref 30–80)
FOLATE SERPL-MCNC: 12.7 NG/ML — SIGNIFICANT CHANGE UP
VIT B12 SERPL-MCNC: 546 PG/ML — SIGNIFICANT CHANGE UP (ref 232–1245)

## 2024-02-19 LAB
ACE SERPL-CCNC: 62 U/L — SIGNIFICANT CHANGE UP (ref 14–82)
AUTO DIFF PNL BLD: ABNORMAL
C-ANCA SER-ACNC: NEGATIVE — SIGNIFICANT CHANGE UP
P-ANCA SER-ACNC: NEGATIVE — SIGNIFICANT CHANGE UP

## 2024-02-20 ENCOUNTER — APPOINTMENT (OUTPATIENT)
Dept: NEUROLOGY | Facility: CLINIC | Age: 49
End: 2024-02-20
Payer: COMMERCIAL

## 2024-02-20 ENCOUNTER — APPOINTMENT (OUTPATIENT)
Dept: MRI IMAGING | Facility: CLINIC | Age: 49
End: 2024-02-20

## 2024-02-20 VITALS — HEART RATE: 77 BPM | RESPIRATION RATE: 18 BRPM | DIASTOLIC BLOOD PRESSURE: 86 MMHG | SYSTOLIC BLOOD PRESSURE: 145 MMHG

## 2024-02-20 VITALS — RESPIRATION RATE: 18 BRPM | SYSTOLIC BLOOD PRESSURE: 140 MMHG | DIASTOLIC BLOOD PRESSURE: 87 MMHG | HEART RATE: 80 BPM

## 2024-02-20 DIAGNOSIS — G35 MULTIPLE SCLEROSIS: ICD-10-CM

## 2024-02-20 LAB — DSDNA AB SER-ACNC: 15 IU/ML — SIGNIFICANT CHANGE UP

## 2024-02-20 PROCEDURE — 96365 THER/PROPH/DIAG IV INF INIT: CPT

## 2024-02-20 NOTE — HISTORY OF PRESENT ILLNESS
[Denies] : Denies [No] : No [N/A] : N/A [Start Time: ___] : Medication Start Time: [unfilled] [End Time: ___] : Medication End Time: [unfilled] [Medication Name: ___] : Medication Name: [unfilled] [Total Amount Administered: ___] : Total Amount Administered: [unfilled] [IV discontinued. Intact. No signs or symptoms of IV complications noted. Time: ___] : IV discontinued. Intact. No signs or symptoms of IV complications noted. Time: [unfilled] [Patient  instructed to seek medical attention with signs and symptoms of adverse effects] : Patient  instructed to seek medical attention with signs and symptoms of adverse effects [Patient left unit in no acute distress] : Patient left unit in no acute distress [Medications administered as ordered and tolerated well.] : Medications administered as ordered and tolerated well. [de-identified] : L hand 24 g [de-identified] : 1621 [de-identified] : Patient tolerated infusion well, no adverse reaction.

## 2024-02-21 LAB
ANA PAT FLD IF-IMP: SIGNIFICANT CHANGE UP
ANA TITR SER: ABNORMAL

## 2024-02-22 ENCOUNTER — APPOINTMENT (OUTPATIENT)
Dept: NEUROLOGY | Facility: CLINIC | Age: 49
End: 2024-02-22
Payer: COMMERCIAL

## 2024-02-22 VITALS — RESPIRATION RATE: 18 BRPM | HEART RATE: 80 BPM | SYSTOLIC BLOOD PRESSURE: 158 MMHG | DIASTOLIC BLOOD PRESSURE: 93 MMHG

## 2024-02-22 VITALS — DIASTOLIC BLOOD PRESSURE: 90 MMHG | RESPIRATION RATE: 18 BRPM | HEART RATE: 82 BPM | SYSTOLIC BLOOD PRESSURE: 150 MMHG

## 2024-02-22 PROCEDURE — 96365 THER/PROPH/DIAG IV INF INIT: CPT

## 2024-02-23 ENCOUNTER — APPOINTMENT (OUTPATIENT)
Dept: NEUROLOGY | Facility: CLINIC | Age: 49
End: 2024-02-23
Payer: COMMERCIAL

## 2024-02-23 VITALS — HEART RATE: 98 BPM | RESPIRATION RATE: 20 BRPM | SYSTOLIC BLOOD PRESSURE: 148 MMHG | DIASTOLIC BLOOD PRESSURE: 80 MMHG

## 2024-02-23 VITALS — RESPIRATION RATE: 20 BRPM | DIASTOLIC BLOOD PRESSURE: 85 MMHG | SYSTOLIC BLOOD PRESSURE: 149 MMHG | HEART RATE: 86 BPM

## 2024-02-23 PROCEDURE — 96365 THER/PROPH/DIAG IV INF INIT: CPT

## 2024-02-23 RX ORDER — METHYLPREDNISOLONE SODIUM SUCCINATE 1 G/16ML
1000 INJECTION, POWDER, LYOPHILIZED, FOR SOLUTION INTRAMUSCULAR; INTRAVENOUS
Qty: 1 | Refills: 0 | Status: ACTIVE | OUTPATIENT
Start: 2024-02-20

## 2024-02-23 NOTE — HISTORY OF PRESENT ILLNESS
[8] : 8 [Denies] : Denies [de-identified] : (discomfort) [No] : No [Yes] : Yes [Right upper extremity] : Right upper extremity [Start Time: ___] : Medication Start Time: [unfilled] [22g] : 22g [End Time: ___] : Medication End Time: [unfilled] [Medication Name: ___] : Medication Name: [unfilled] [Total Amount Administered: ___] : Total Amount Administered: [unfilled] [IV discontinued. Intact. No signs or symptoms of IV complications noted. Time: ___] : IV discontinued. Intact. No signs or symptoms of IV complications noted. Time: [unfilled] [Patient left unit in no acute distress] : Patient left unit in no acute distress [Patient  instructed to seek medical attention with signs and symptoms of adverse effects] : Patient  instructed to seek medical attention with signs and symptoms of adverse effects [Medications administered as ordered and tolerated well.] : Medications administered as ordered and tolerated well. [de-identified] : forearm [de-identified] : 3:32pm [de-identified] : Generic: Methylprednisolone Dose: 1000mg Infusion Rate: 100ml/hr in 100mL 0.9% NS NDC#:0217-8762-02 [id number]#:[id number] Exp:10/2026 Was this medication buy and bill? Yes Specialty Pharmacy: No Dx: Jack anesthesia MD Prescriber: Dr. Choe  CPT: [id number] Next Appointment:[email or web address]

## 2024-02-26 NOTE — HISTORY OF PRESENT ILLNESS
[5] : 5 [Denies] : Denies [Yes] : Yes [No] : No [22g] : 22g [Right upper extremity] : Right upper extremity [Start Time: ___] : Medication Start Time: [unfilled] [Medication Name: ___] : Medication Name: [unfilled] [Patient  instructed to seek medical attention with signs and symptoms of adverse effects] : Patient  instructed to seek medical attention with signs and symptoms of adverse effects [IV discontinued. Intact. No signs or symptoms of IV complications noted. Time: ___] : IV discontinued. Intact. No signs or symptoms of IV complications noted. Time: [unfilled] [Patient left unit in no acute distress] : Patient left unit in no acute distress [Medications administered as ordered and tolerated well.] : Medications administered as ordered and tolerated well. [End Time: ___] : Medication End Time: [unfilled] [Total Amount Administered: ___] : Total Amount Administered: [unfilled] [de-identified] : right leg [de-identified] : signed consent form denying pregnancy [de-identified] : forearm [de-identified] : Generic: Methylprednisolone Dose: 1000mg Infusion Rate: 100ml/hr in 100mL 0.9% NS NDC#: 9443-8273-80 [id number]#: [id number] Exp:10/2026 Was this medication buy and bill? Yes Specialty Pharmacy: N/A Dx: Jack Anesthesia MD Prescriber: Dr. Choe  CPT: [id number] Next Appointment: 2/23/24

## 2024-02-27 LAB
AQP4 H2O CHANNEL AB SERPL IA-ACNC: NEGATIVE — SIGNIFICANT CHANGE UP
MOG AB SER QL CBA IFA: NEGATIVE — SIGNIFICANT CHANGE UP

## 2024-02-28 ENCOUNTER — TRANSCRIPTION ENCOUNTER (OUTPATIENT)
Age: 49
End: 2024-02-28

## 2024-03-14 ENCOUNTER — APPOINTMENT (OUTPATIENT)
Dept: NEUROLOGY | Facility: CLINIC | Age: 49
End: 2024-03-14

## 2024-07-02 ENCOUNTER — APPOINTMENT (OUTPATIENT)
Dept: SURGERY | Facility: CLINIC | Age: 49
End: 2024-07-02

## 2024-09-10 ENCOUNTER — APPOINTMENT (OUTPATIENT)
Dept: GASTROENTEROLOGY | Facility: CLINIC | Age: 49
End: 2024-09-10

## 2024-12-03 ENCOUNTER — NON-APPOINTMENT (OUTPATIENT)
Age: 49
End: 2024-12-03

## 2025-07-01 ENCOUNTER — NON-APPOINTMENT (OUTPATIENT)
Age: 50
End: 2025-07-01

## 2025-08-18 ENCOUNTER — NON-APPOINTMENT (OUTPATIENT)
Age: 50
End: 2025-08-18